# Patient Record
Sex: MALE | Race: WHITE | Employment: FULL TIME | URBAN - METROPOLITAN AREA
[De-identification: names, ages, dates, MRNs, and addresses within clinical notes are randomized per-mention and may not be internally consistent; named-entity substitution may affect disease eponyms.]

---

## 2018-08-27 PROBLEM — M25.579 JOINT PAIN OF ANKLE AND FOOT: Status: ACTIVE | Noted: 2018-08-27

## 2018-08-27 PROBLEM — M79.89 LEG SWELLING: Status: ACTIVE | Noted: 2018-08-27

## 2018-08-27 PROBLEM — D45 POLYCYTHEMIA VERA (HCC): Status: ACTIVE | Noted: 2018-08-27

## 2018-08-27 PROBLEM — I87.2 CHRONIC VENOUS INSUFFICIENCY: Status: ACTIVE | Noted: 2018-08-27

## 2018-08-27 PROBLEM — G43.109 MIGRAINE HEADACHE WITH AURA: Status: ACTIVE | Noted: 2018-08-27

## 2018-08-27 PROBLEM — M32.9 LUPUS (HCC): Status: ACTIVE | Noted: 2018-08-27

## 2018-08-27 PROBLEM — M54.50 LOWER BACK PAIN: Status: ACTIVE | Noted: 2018-08-27

## 2018-08-27 PROBLEM — M54.2 NECK PAIN: Status: ACTIVE | Noted: 2018-08-27

## 2018-08-27 PROBLEM — R91.1 LUNG NODULE: Status: ACTIVE | Noted: 2018-08-27

## 2018-08-27 PROBLEM — E78.1 HYPERTRIGLYCERIDEMIA: Status: ACTIVE | Noted: 2018-08-27

## 2018-08-27 RX ORDER — CYCLOBENZAPRINE HCL 10 MG
TABLET ORAL
COMMUNITY
End: 2018-08-30 | Stop reason: ALTCHOICE

## 2018-08-27 RX ORDER — GUAIFENESIN 100 MG/5ML
81 LIQUID (ML) ORAL DAILY
COMMUNITY

## 2018-08-27 RX ORDER — CHOLECALCIFEROL (VITAMIN D3) 125 MCG
CAPSULE ORAL
COMMUNITY
End: 2018-08-30 | Stop reason: ALTCHOICE

## 2018-08-27 RX ORDER — TRIAMTERENE/HYDROCHLOROTHIAZID 37.5-25 MG
TABLET ORAL DAILY
COMMUNITY
End: 2018-08-30 | Stop reason: ALTCHOICE

## 2018-08-27 RX ORDER — METAXALONE 800 MG/1
TABLET ORAL
COMMUNITY
End: 2018-08-30 | Stop reason: ALTCHOICE

## 2018-08-27 RX ORDER — CETIRIZINE HCL 10 MG
TABLET ORAL
COMMUNITY
End: 2018-08-30 | Stop reason: ALTCHOICE

## 2018-08-30 ENCOUNTER — OFFICE VISIT (OUTPATIENT)
Dept: FAMILY MEDICINE CLINIC | Age: 63
End: 2018-08-30

## 2018-08-30 VITALS
HEART RATE: 77 BPM | HEIGHT: 71 IN | WEIGHT: 188.1 LBS | RESPIRATION RATE: 20 BRPM | BODY MASS INDEX: 26.33 KG/M2 | SYSTOLIC BLOOD PRESSURE: 120 MMHG | TEMPERATURE: 97.6 F | OXYGEN SATURATION: 97 % | DIASTOLIC BLOOD PRESSURE: 80 MMHG

## 2018-08-30 DIAGNOSIS — E53.8 VITAMIN B 12 DEFICIENCY: Primary | ICD-10-CM

## 2018-08-30 DIAGNOSIS — E78.1 HYPERTRIGLYCERIDEMIA: ICD-10-CM

## 2018-08-30 DIAGNOSIS — I10 ESSENTIAL HYPERTENSION: ICD-10-CM

## 2018-08-30 DIAGNOSIS — E10.65 HYPERGLYCEMIA DUE TO TYPE 1 DIABETES MELLITUS (HCC): ICD-10-CM

## 2018-08-30 RX ORDER — BISMUTH SUBSALICYLATE 262 MG
1 TABLET,CHEWABLE ORAL DAILY
COMMUNITY

## 2018-08-30 RX ORDER — METOPROLOL TARTRATE 25 MG/1
TABLET, FILM COATED ORAL 2 TIMES DAILY
COMMUNITY

## 2018-08-30 RX ORDER — CYANOCOBALAMIN 1000 UG/ML
1000 INJECTION, SOLUTION INTRAMUSCULAR; SUBCUTANEOUS ONCE
Qty: 1 VIAL | Refills: 0
Start: 2018-08-30 | End: 2018-08-30

## 2018-08-30 RX ORDER — ROSUVASTATIN CALCIUM 40 MG/1
40 TABLET, COATED ORAL
COMMUNITY

## 2018-08-30 NOTE — PATIENT INSTRUCTIONS
Type 1 Diabetes: Care Instructions  Your Care Instructions    Type 1 diabetes is a lifelong disease that develops when the pancreas stops making insulin. The body needs insulin to let sugar (glucose) move from the blood into the body's cells, where it can be used for energy or stored for later use. Without insulin, the sugar cannot get into the cells to do its work. It stays in the blood instead. This can cause high blood sugar levels. A person has diabetes when the blood sugar is too high. Over time, diabetes can lead to diseases of the heart, blood vessels, nerves, kidneys, and eyes. To treat type 1 diabetes, you need insulin. You can give yourself insulin through an insulin pump, an insulin pen, or a syringe (needle). Insulin, exercise, and a healthy diet can help prevent or delay problems from diabetes. With education and support, you will treat diabetes as a part of your life-not your whole life. Seek support when you need it from your family, friends, and your doctor or other diabetes experts. Follow-up care is a key part of your treatment and safety. Be sure to make and go to all appointments, and call your doctor if you are having problems. It's also a good idea to know your test results and keep a list of the medicines you take. How can you care for yourself at home? · Take your insulin on time and in the right dose. This helps keep your blood sugar steady. Do not stop or change your insulin without talking to your doctor first.  · Check and record your blood sugar as often as directed. These records can help your doctor see how you are doing and adjust your treatment if needed. It is important to keep track of any symptoms you have, such as low blood sugar, and any changes in your activities, diet, or insulin use. · Eat a good diet that spreads carbohydrate throughout the day. Carbohydrate-the body's main source of fuel-affects blood sugar more than any other nutrient.  Carbohydrate is in fruits, vegetables, milk, and yogurt. It also is in breads, cereals, vegetables such as potatoes and corn, and sugary foods such as candy and cakes. · Aim for 30 minutes of exercise on most, preferably all, days of the week. Walking is a good choice. You also may want to do other activities, such as running, swimming, cycling, or playing tennis or team sports. If yourdoctor says it's okay, do muscle-strengthening exercises at least 2 times a week. · Control your cholesterol, and try to keep your blood pressure at 140/90 or below. Exercise and healthy eating can help with these goals. If you have medicine for cholesterol or high blood pressure, take it as directed. Do not stop or change a medicine without talking to your doctor first.  · If you have discussed it with your doctor, take a low-dose aspirin every day to help prevent heart attack and stroke. Do not start taking aspirin unless your doctor knows about it. · Do not smoke. If you need help quitting, talk to your doctor about stop-smoking programs and medicines. These can increase your chances of quitting for good. · Check your feet daily for blisters, cracks, and sores. Have your doctor look at your feet whenever you have a checkup. · Get a checkup every 3 to 6 months. Your doctor will tell you how often to come in. You will need regular tests such as:  ¨ A hemoglobin A1c test. You may need this test more often than once a year. It is a good measure of how well your treatment is working. ¨ A cholesterol test.  ¨ A urine test for protein. This checks for kidney problems. ¨ A complete foot exam.  ¨ An eye exam, even if you do not think your vision has changed. When should you call for help? Call 911 anytime you think you may need emergency care. For example, call if:    · You passed out (lost consciousness), or you suddenly become very sleepy or confused.  (You may have very low blood sugar.)     · You have symptoms of high blood sugar, such as:  ¨ Blurred vision. ¨ Trouble staying awake or being woken up. ¨ Fast, deep breathing. ¨ Breath that smells fruity. ¨ Belly pain, not feeling hungry, and vomiting. ¨ Feeling confused.    Call your doctor now or seek immediate medical care if:    · Your blood sugar stays higher than the level your doctor has set for you.     · You have symptoms of low blood sugar, such as:  ¨ Sweating. ¨ Feeling nervous, shaky, and weak. ¨ Extreme hunger and slight nausea. ¨ Dizziness and headache. ¨ Blurred vision. ¨ Confusion.    Watch closely for changes in your health, and be sure to contact your doctor if:    · You often have problems controlling your blood sugar.     · You have symptoms of long-term diabetes problems, such as:  ¨ New vision changes. ¨ New pain, numbness, or tingling in your hands or feet. ¨ Skin problems. Where can you learn more? Go to http://ernie-rebecca.info/. Enter P859 in the search box to learn more about \"Type 1 Diabetes: Care Instructions. \"  Current as of: December 7, 2017  Content Version: 11.7  © 3623-1727 [employer]. Care instructions adapted under license by [employer] (which disclaims liability or warranty for this information). If you have questions about a medical condition or this instruction, always ask your healthcare professional. [address] any warranty or liability for your use of this information.

## 2018-08-30 NOTE — PROGRESS NOTES
Assessment/Plan:     Diagnoses and all orders for this visit:    1. Vitamin B 12 deficiency  -     cyanocobalamin (VITAMIN B12) 1,000 mcg/mL injection; 1 mL by IntraMUSCular route once for 1 dose. - Injection today. 2. Hypertriglyceridemia   -Presumed stable. Continue current therapy. Need updated labs. 3. Hyperglycemia due to type 1 diabetes mellitus ([facility] [address]   - Managed by Endo    4. Essential hypertension   - Stable, continue current therapy      Follow-up Disposition: Not on File    Discussed expected course/resolution/complications of diagnosis in detail with patient.    Medication risks/benefits/costs/interactions/alternatives discussed with patient.    Pt was given after visit summary which includes diagnoses, current medications & vitals. Pt expressed understanding with the diagnosis and plan        Subjective:      Maria Guadalupe Cleveland is a 58 y.o. male who presents for had concerns including Annual Wellness Visit and Injection B12. At clinic today nurse noticed sweating and incoherence and took blood sugar and it was 36. Three glucose tabs were taken and some sugar packets and blood sugar came up to 78 then 102. Incoherence and sweating resolved. Instructed Mr. Cleveland to follow up with Dr. Paul Acosta. Here today for complete physical.  Sees a dentist every 6 months. Eye doctor every year. Unsure of when colonoscopy is due. Sees a cardiologist every year. Sees endo every 4 months. Reports having some hypoglycemia episodes. Had a hypoglycemia episode last week. Current Outpatient Prescriptions   Medication Sig Dispense Refill    metoprolol tartrate (LOPRESSOR) 25 mg tablet Take  by mouth two (2) times a day.  multivitamin (ONE A DAY) tablet Take 1 Tab by mouth daily.  rosuvastatin (CRESTOR) 40 mg tablet Take 40 mg by mouth nightly.  cyanocobalamin (VITAMIN B12) 1,000 mcg/mL injection 1 mL by IntraMUSCular route once for 1 dose.  1 Vial 0    aspirin 81 mg chewable tablet Take 81 mg by mouth daily. Allergies   Allergen Reactions    Sulfasalazine Unknown (comments)     Noted: itching    Zetia [Ezetimibe] Unknown (comments)     None noted       ROS:   Review of Systems   Constitutional: Negative for fever and malaise/fatigue. HENT: Negative for congestion, ear pain, sinus pain and sore throat. Eyes: Negative for blurred vision. Respiratory: Negative for cough and shortness of breath. Cardiovascular: Negative for chest pain, claudication and leg swelling. Gastrointestinal: Negative for abdominal pain, nausea and vomiting. Genitourinary: Negative for dysuria. Musculoskeletal: Negative for back pain, falls, joint pain and neck pain. Neurological: Negative for dizziness, tingling, weakness and headaches. Psychiatric/Behavioral: Negative for depression. The patient is not nervous/anxious. Objective:     Visit Vitals    /80 (BP 1 Location: Right arm, BP Patient Position: Sitting)    Pulse 77    Temp 97.6 °F (36.4 °C) (Oral)    Resp 20    Ht 5' 11\" (1.803 m)    Wt 188 lb 1.6 oz (85.3 kg)    SpO2 97%    BMI 26.23 kg/m2       Vitals and Nurse Documentation reviewed. Physical Exam   Constitutional: He is well-developed, well-nourished, and in no distress. HENT:   Head: Normocephalic and atraumatic. Right Ear: Tympanic membrane normal.   Left Ear: Tympanic membrane normal.   Nose: Nose normal.   Mouth/Throat: Oropharynx is clear and moist. Normal dentition. Eyes: EOM are normal. Pupils are equal, round, and reactive to light. Right eye exhibits no discharge. Left eye exhibits no discharge. Right conjunctiva is not injected. Left conjunctiva is not injected. Neck: Neck supple. Carotid bruit is not present. No thyroid mass and no thyromegaly present. Cardiovascular: Normal rate, regular rhythm, S1 normal and S2 normal.  Exam reveals no gallop and no friction rub. No murmur heard.   Pulses:       Dorsalis pedis pulses are 2+ on the right side Posterior tibial pulses are 2+ on the right side   Pulmonary/Chest: Breath sounds normal.   Abdominal: Normal appearance and bowel sounds are normal. He exhibits no distension and no mass. There is no hepatosplenomegaly. There is no tenderness. Musculoskeletal: Normal range of motion. Lymphadenopathy:     He has no cervical adenopathy. Neurological: He is alert. He has normal sensation and normal strength. Gait normal. Gait normal.   Skin: Skin is warm, dry and intact. No rash noted. Psychiatric: Mood and affect normal.       No results found for this or any previous visit.

## 2018-08-30 NOTE — PROGRESS NOTES
Pt here for   Chief Complaint   Patient presents with    Annual Wellness Visit    Injection B12     1. Have you been to the ER, urgent care clinic since your last visit? Hospitalized since your last visit? No    2. Have you seen or consulted any other health care providers outside of the [address] [place] [address] since your last visit? Include any pap smears or colon screening.  No       Pt denies pain at this time        PHQ over the last two weeks 8/30/2018   Little interest or pleasure in doing things Not at all   Feeling down, depressed, irritable, or hopeless Not at all   Total Score PHQ 2 0

## 2018-10-03 ENCOUNTER — CLINICAL SUPPORT (OUTPATIENT)
Dept: FAMILY MEDICINE CLINIC | Age: 63
End: 2018-10-03

## 2018-10-03 VITALS
BODY MASS INDEX: 26.18 KG/M2 | DIASTOLIC BLOOD PRESSURE: 69 MMHG | TEMPERATURE: 98.3 F | HEART RATE: 83 BPM | WEIGHT: 187 LBS | SYSTOLIC BLOOD PRESSURE: 117 MMHG | OXYGEN SATURATION: 98 % | RESPIRATION RATE: 18 BRPM | HEIGHT: 71 IN

## 2018-10-03 DIAGNOSIS — E53.8 B12 DEFICIENCY: Primary | ICD-10-CM

## 2018-10-03 RX ORDER — CYANOCOBALAMIN 1000 UG/ML
1000 INJECTION, SOLUTION INTRAMUSCULAR; SUBCUTANEOUS ONCE
Qty: 1 ML | Refills: 0
Start: 2018-10-03 | End: 2018-10-03

## 2018-11-07 ENCOUNTER — TELEPHONE (OUTPATIENT)
Dept: FAMILY MEDICINE CLINIC | Age: 63
End: 2018-11-07

## 2018-11-07 DIAGNOSIS — E53.8 B12 DEFICIENCY: Primary | ICD-10-CM

## 2018-11-07 NOTE — TELEPHONE ENCOUNTER
Patient came into clinic today to have b12 shot. He believed that we still carried B12 in the office and as a result did not bring medication with him. Would you be able to order B12 prescription to his pharmacy so he can bring it in for future visit.   Gina Dennis

## 2018-11-08 RX ORDER — CYANOCOBALAMIN 1000 UG/ML
1000 INJECTION, SOLUTION INTRAMUSCULAR; SUBCUTANEOUS
Qty: 10 ML | Refills: 1 | Status: SHIPPED | OUTPATIENT
Start: 2018-11-08 | End: 2020-01-24

## 2018-11-09 NOTE — TELEPHONE ENCOUNTER
11/08/18  7:36 PM    1. B12 deficiency    - cyanocobalamin (VITAMIN B12) 1,000 mcg/mL injection; 1 mL by IntraMUSCular route every thirty (30) days.   Dispense: 10 mL; Refill: 1      Sudheer Mena MD

## 2019-05-24 ENCOUNTER — OFFICE VISIT (OUTPATIENT)
Dept: FAMILY MEDICINE CLINIC | Age: 64
End: 2019-05-24

## 2019-05-24 VITALS
TEMPERATURE: 97.9 F | DIASTOLIC BLOOD PRESSURE: 72 MMHG | BODY MASS INDEX: 24.92 KG/M2 | HEART RATE: 65 BPM | RESPIRATION RATE: 16 BRPM | WEIGHT: 178 LBS | OXYGEN SATURATION: 98 % | HEIGHT: 71 IN | SYSTOLIC BLOOD PRESSURE: 119 MMHG

## 2019-05-24 DIAGNOSIS — B02.9 HERPES ZOSTER WITHOUT COMPLICATION: Primary | ICD-10-CM

## 2019-05-24 RX ORDER — BLOOD SUGAR DIAGNOSTIC
STRIP MISCELLANEOUS
Refills: 3 | COMMUNITY
Start: 2019-04-10

## 2019-05-24 RX ORDER — VALACYCLOVIR HYDROCHLORIDE 1 G/1
1000 TABLET, FILM COATED ORAL 3 TIMES DAILY
Qty: 21 TAB | Refills: 0 | Status: SHIPPED | OUTPATIENT
Start: 2019-05-24 | End: 2019-05-31

## 2019-05-24 RX ORDER — BLOOD-GLUCOSE METER
EACH MISCELLANEOUS
Refills: 0 | COMMUNITY
Start: 2019-04-10

## 2019-05-24 RX ORDER — FLASH GLUCOSE SENSOR
KIT MISCELLANEOUS
Refills: 11 | COMMUNITY
Start: 2019-04-09

## 2019-05-24 RX ORDER — FLASH GLUCOSE SCANNING READER
EACH MISCELLANEOUS
Refills: 0 | COMMUNITY
Start: 2019-04-09

## 2019-05-24 RX ORDER — INSULIN DEGLUDEC 200 U/ML
INJECTION, SOLUTION SUBCUTANEOUS
COMMUNITY
Start: 2019-01-03

## 2019-05-24 RX ORDER — LANCETS
EACH MISCELLANEOUS
Refills: 1 | COMMUNITY
Start: 2019-04-09

## 2019-05-24 NOTE — PROGRESS NOTES
Identified pt with two pt identifiers(name and ). Reviewed record in preparation for visit and have obtained necessary documentation. Chief Complaint   Patient presents with    Back Pain     low back pain on both side; pain radiates down right leg; has become increasingly worse    Rash     painfu and itchy rash on back, flank (bilateral); rash first appeared approx 3 days ago        Health Maintenance Due   Topic    Hepatitis C Screening     Pneumococcal 0-64 years (1 of 1 - PPSV23)    FOOT EXAM Q1     EYE EXAM RETINAL OR DILATED     DTaP/Tdap/Td series (1 - Tdap)    Shingrix Vaccine Age 50> (1 of 2)    FOBT Q 1 YEAR AGE 50-75        Coordination of Care Questionnaire:  :   1) Have you been to an emergency room, urgent care, or hospitalized since your last visit? If yes, where when, and reason for visit? no       2. Have seen or consulted any other health care provider since your last visit? If yes, where when, and reason for visit? NO      Patient is accompanied by self I have received verbal consent from Alexandria Cleveland to discuss any/all medical information while they are present in the room.

## 2019-05-24 NOTE — PROGRESS NOTES
Assessment/Plan:     Diagnoses and all orders for this visit:    1. Herpes zoster without complication  -     valACYclovir (VALTREX) 1 gram tablet; Take 1 Tab by mouth three (3) times daily for 7 days. - Worsening, start valacyclovir today, aleve for pain. RTC if pain and burning persists, otherwise RTC when completely healed for discussion on Shingrix vaccination         Follow-up and Dispositions    · Return in about 3 months (around 8/24/2019) for Follow Up. Discussed expected course/resolution/complications of diagnosis in detail with patient.    Medication risks/benefits/costs/interactions/alternatives discussed with patient.    Pt was given after visit summary which includes diagnoses, current medications & vitals. Pt expressed understanding with the diagnosis and plan        Subjective:      Earline Cleveland is a 61 y.o. male who presents for had concerns including Back Pain (low back pain on both side; pain radiates down right leg; has become increasingly worse) and Rash (painfu and itchy rash on back, flank (bilateral); rash first appeared approx 3 days ago). Here today for rash that started 3-4 days ago with pain around the area that started about 2 weeks ago. Rash is rad and has blisters and madsen. Rash is located on left lower back. He has not put anything on the rash     Current Outpatient Medications   Medication Sig Dispense Refill    insulin degludec (TRESIBA FLEXTOUCH U-200) 200 unit/mL (3 mL) inpn INJECT UP TO 40 UNITS SUBCUTANEOUSLY ONE TIME DAILY ONCE A DAY SUBCUTANEOUS 90 DAYS      valACYclovir (VALTREX) 1 gram tablet Take 1 Tab by mouth three (3) times daily for 7 days. 21 Tab 0    cyanocobalamin (VITAMIN B12) 1,000 mcg/mL injection 1 mL by IntraMUSCular route every thirty (30) days. 10 mL 1    metoprolol tartrate (LOPRESSOR) 25 mg tablet Take  by mouth two (2) times a day.  multivitamin (ONE A DAY) tablet Take 1 Tab by mouth daily.       rosuvastatin (CRESTOR) 40 mg tablet Take 40 mg by mouth nightly.  aspirin 81 mg chewable tablet Take 81 mg by mouth daily.  FREESTYLE FOSTER 14 DAY READER misc AS DIRECTED 1 DOSE  0    FREESTYLE FOSTER 10 DAY SENSOR kit AS DIRECTED EVERY 14 DAYS SUBCUTANEOUS 28 DAYS  11    ACCU-CHEK GUIDE GLUCOSE METER misc USE AS DIRECTED  0    ACCU-CHEK GUIDE strip TESTS BLOOD SUGAR THREE TIMES A DAY IN VITRO 90 DAYS  3    ACCU-CHEK SOFTCLIX LANCETS misc USE TO TEST BLOOD SUGAR THREE TIMES A DAY XXX 90 DAYS  1       Allergies   Allergen Reactions    Sulfa (Sulfonamide Antibiotics) Nausea and Vomiting    Sulfasalazine Unknown (comments)     Noted: itching    Zetia [Ezetimibe] Not Reported This Time     None noted       ROS:   Review of Systems   Constitutional: Positive for malaise/fatigue. Negative for fever. Respiratory: Negative for cough and shortness of breath. Cardiovascular: Negative for chest pain and palpitations. Musculoskeletal: Positive for back pain. Skin: Positive for itching and rash. Neurological: Negative for dizziness and headaches. Objective:     Visit Vitals  /72   Pulse 65   Temp 97.9 °F (36.6 °C) (Oral)   Resp 16   Ht 5' 11\" (1.803 m)   Wt 178 lb (80.7 kg)   SpO2 98%   BMI 24.83 kg/m²       Vitals and Nurse Documentation reviewed. Physical Exam   Constitutional: He is well-developed, well-nourished, and in no distress. No distress. HENT:   Head: Normocephalic and atraumatic. Cardiovascular: Normal rate, regular rhythm and normal heart sounds. Exam reveals no gallop and no friction rub. No murmur heard. Pulmonary/Chest: Effort normal and breath sounds normal. No respiratory distress. He has no wheezes. He has no rales. Neurological: He is alert. Skin: He is not diaphoretic. Large scattered eruption of vesicles and erythematous rash noted with two satellite sites proximal to large area.     Psychiatric: Affect normal.       Results for orders placed or performed in visit on 05/08/19   AMB EXT LDL-C   Result Value Ref Range    LDL-C, External 74    AMB EXT CREATININE   Result Value Ref Range    Creatinine, External 1.13    AMB EXT HGBA1C   Result Value Ref Range    Hemoglobin A1c, External 8.2    AMB EXT URINE MICROALBUMIN   Result Value Ref Range    Urine Microalbumin, External <3.0

## 2019-05-24 NOTE — PATIENT INSTRUCTIONS

## 2020-03-18 ENCOUNTER — TELEPHONE (OUTPATIENT)
Dept: FAMILY MEDICINE CLINIC | Age: 65
End: 2020-03-18

## 2020-03-18 NOTE — TELEPHONE ENCOUNTER
Pt is calling to come in to see a doctor to have a note to return to work from a prior cold that we have not seen him for. He does still have a light cough.     Best Contact  [phone]

## 2020-03-19 ENCOUNTER — TELEPHONE (OUTPATIENT)
Dept: FAMILY MEDICINE CLINIC | Age: 65
End: 2020-03-19

## 2020-03-19 ENCOUNTER — OFFICE VISIT (OUTPATIENT)
Dept: FAMILY MEDICINE CLINIC | Age: 65
End: 2020-03-19

## 2020-03-19 VITALS
RESPIRATION RATE: 16 BRPM | HEART RATE: 72 BPM | WEIGHT: 175.2 LBS | HEIGHT: 71 IN | BODY MASS INDEX: 24.53 KG/M2 | SYSTOLIC BLOOD PRESSURE: 120 MMHG | TEMPERATURE: 97.4 F | OXYGEN SATURATION: 95 % | DIASTOLIC BLOOD PRESSURE: 72 MMHG

## 2020-03-19 DIAGNOSIS — J06.9 VIRAL UPPER RESPIRATORY TRACT INFECTION: Primary | ICD-10-CM

## 2020-03-19 NOTE — LETTER
3/19/2020 11:55 AM 
 
 
 
 
RE:   Mr. Nina Goldberg  
 [address] [place] [id number] To whom it may concern: Mr. Arsenio Rutledge was seen in clinic today only. He exhibits no fever or respiratory symptoms. He denies cough and shortness of breath. He denies any known exposure to COVID-19. To the best of our ability, he may return to work. Sincerely, Chacorta Tobin NP

## 2020-03-19 NOTE — PROGRESS NOTES
Assessment/Plan:     Diagnoses and all orders for this visit:    1. Viral upper respiratory tract infection    - Improved. Letter given for work to return to the best of our ability patient may return to work. No fever. This office does not have the ability to test for COVID-19. Discussed expected course/resolution/complications of diagnosis in detail with patient. Medication risks/benefits/costs/interactions/alternatives discussed with patient. Pt was given after visit summary which includes diagnoses, current medications & vitals. Pt expressed understanding with the diagnosis and plan        Subjective:      Maria Guadalupe Cleveland is a 59 y.o. male who presents for had concerns including Letter for School/Work. Here today for note to return to work. States he had a \"cold\" for the past 2 days. Symptoms were cough and congestion on Tuesday then Wednesday improvement. Denies fevers or SOB. Denies any known exposure for COVID-19. Was not seen by anyone. Current Outpatient Medications   Medication Sig Dispense Refill    insulin degludec (TRESIBA FLEXTOUCH U-200) 200 unit/mL (3 mL) inpn INJECT UP TO 40 UNITS SUBCUTANEOUSLY ONE TIME DAILY ONCE A DAY SUBCUTANEOUS 90 DAYS      FREESTYLE FOSTER 14 DAY READER misc AS DIRECTED 1 DOSE  0    FREESTYLE FOSTER 10 DAY SENSOR kit AS DIRECTED EVERY 14 DAYS SUBCUTANEOUS 28 DAYS  11    ACCU-CHEK GUIDE GLUCOSE METER misc USE AS DIRECTED  0    ACCU-CHEK GUIDE strip TESTS BLOOD SUGAR THREE TIMES A DAY IN VITRO 90 DAYS  3    ACCU-CHEK SOFTCLIX LANCETS misc USE TO TEST BLOOD SUGAR THREE TIMES A DAY XXX 90 DAYS  1    metoprolol tartrate (LOPRESSOR) 25 mg tablet Take  by mouth two (2) times a day.  multivitamin (ONE A DAY) tablet Take 1 Tab by mouth daily.  rosuvastatin (CRESTOR) 40 mg tablet Take 40 mg by mouth nightly.  aspirin 81 mg chewable tablet Take 81 mg by mouth daily.       cyanocobalamin (VITAMIN B12) 1,000 mcg/mL injection INJECT 1 ML BY INTRAMUSCULAR ROUTE EVERY THIRTY (30) DAYS. 3 mL 1       Allergies   Allergen Reactions    Sulfa (Sulfonamide Antibiotics) Nausea and Vomiting    Sulfasalazine Unknown (comments)     Noted: itching    Zetia [Ezetimibe] Not Reported This Time     None noted     Past Medical History:   Diagnosis Date    [facility] failure ([facility] [address]     [facility]      Past Surgical History:   Procedure Laterality Date    CARDIAC SURG PROCEDURE UNLIST       History reviewed. No pertinent family history.   Social History     Socioeconomic History    Marital status:      Spouse name: Not on file    Number of children: Not on file    Years of education: Not on file    Highest education level: Not on file   Occupational History    Not on file   Social Needs    Financial resource strain: Not on file    Food insecurity     Worry: Not on file     Inability: Not on file    Transportation needs     Medical: Not on file     Non-medical: Not on file   Tobacco Use    Smoking status: Never Smoker    Smokeless tobacco: Never Used   Substance and Sexual Activity    Alcohol use: Yes     Comment: Rarely    Drug use: No    Sexual activity: Yes     Partners: Female     Birth control/protection: None   Lifestyle    Physical activity     Days per week: Not on file     Minutes per session: Not on file    Stress: Not on file   Relationships    Social connections     Talks on phone: Not on file     Gets together: Not on file     Attends Nondenominational service: Not on file     Active member of club or organization: Not on file     Attends meetings of clubs or organizations: Not on file     Relationship status: Not on file    Intimate partner violence     Fear of current or ex partner: Not on file     Emotionally abused: Not on file     Physically abused: Not on file     Forced sexual activity: Not on file   Other Topics Concern    Not on file   Social History Narrative    Not on file       HPI      ROS:   Review of Systems Constitutional: Negative for chills, fever and malaise/fatigue. HENT: Negative for congestion, ear pain and sinus pain. Eyes: Negative for blurred vision. Respiratory: Negative for cough, sputum production and shortness of breath. Cardiovascular: Negative for chest pain, palpitations and leg swelling. Neurological: Negative for dizziness and headaches. Objective:     Visit Vitals  /72   Pulse 72   Temp 97.4 °F (36.3 °C) (Oral)   Resp 16   Ht 5' 11\" (1.803 m)   Wt 175 lb 3.2 oz (79.5 kg)   SpO2 95%   BMI 24.44 kg/m²         Vitals and Nurse Documentation reviewed. Physical Exam  Constitutional:       General: He is not in acute distress. HENT:      Right Ear: No middle ear effusion. Tympanic membrane is not erythematous or bulging. Left Ear:  No middle ear effusion. Tympanic membrane is not erythematous or bulging. Nose: No mucosal edema. Right Sinus: No maxillary sinus tenderness or frontal sinus tenderness. Left Sinus: No maxillary sinus tenderness or frontal sinus tenderness. Mouth/Throat:      Pharynx: No oropharyngeal exudate or posterior oropharyngeal erythema. Cardiovascular:      Heart sounds: S1 normal and S2 normal. No murmur. No friction rub. No gallop. Pulmonary:      Effort: No respiratory distress. Breath sounds: Normal breath sounds. No wheezing. Lymphadenopathy:      Cervical: No cervical adenopathy. Neurological:      Mental Status: He is oriented to person, place, and time.          Results for orders placed or performed in visit on 05/08/19   AMB EXT LDL-C   Result Value Ref Range    LDL-C, External 74    AMB EXT CREATININE   Result Value Ref Range    Creatinine, External 1.13    AMB EXT HGBA1C   Result Value Ref Range    Hemoglobin A1c, External 8.2    AMB EXT URINE MICROALBUMIN   Result Value Ref Range    Urine Microalbumin, External <3.0

## 2020-03-19 NOTE — TELEPHONE ENCOUNTER
Pt states that he was not seen by any Urgent Care and would like to schedule with either Raghavendra or Huber so that he can return to work.     States that he no longer has a cough, fever, or SOB     Can he be seen in office

## 2020-03-19 NOTE — PROGRESS NOTES
Identified pt with two pt identifiers(name and ). Reviewed record in preparation for visit and have obtained necessary documentation. Chief Complaint   Patient presents with    Letter for School/Work        Health Maintenance Due   Topic    Hepatitis C Screening     Foot Exam Q1     Eye Exam Retinal or Dilated     DTaP/Tdap/Td series (1 - Tdap)    Shingrix Vaccine Age 50> (1 of 2)    FOBT Q1Y Age 54-65     Influenza Age 5 to Adult     MICROALBUMIN Q1        Coordination of Care Questionnaire:  :   1) Have you been to an emergency room, urgent care, or hospitalized since your last visit? If yes, where when, and reason for visit? no      2. Have seen or consulted any other health care provider since your last visit? Yes  If yes, where when, and reason for visit? Dr. Alonzo Cummins        Patient is accompanied by self I have received verbal consent from Pamela Cleveland to discuss any/all medical information while they are present in the room.

## 2020-12-11 DIAGNOSIS — E53.8 B12 DEFICIENCY: ICD-10-CM

## 2020-12-14 RX ORDER — CYANOCOBALAMIN 1000 UG/ML
INJECTION, SOLUTION INTRAMUSCULAR; SUBCUTANEOUS
Qty: 3 ML | Refills: 1 | Status: SHIPPED | OUTPATIENT
Start: 2020-12-14 | End: 2021-05-17

## 2021-05-05 ENCOUNTER — TRANSCRIBE ORDER (OUTPATIENT)
Dept: SCHEDULING | Age: 66
End: 2021-05-05

## 2021-05-05 DIAGNOSIS — M75.111 NONTRAUMATIC INCOMPLETE TEAR OF RIGHT ROTATOR CUFF: Primary | ICD-10-CM

## 2021-05-17 DIAGNOSIS — E53.8 B12 DEFICIENCY: ICD-10-CM

## 2021-05-17 RX ORDER — CYANOCOBALAMIN 1000 UG/ML
INJECTION, SOLUTION INTRAMUSCULAR; SUBCUTANEOUS
Qty: 3 ML | Refills: 1 | Status: SHIPPED | OUTPATIENT
Start: 2021-05-17 | End: 2022-01-31 | Stop reason: SDUPTHER

## 2021-05-18 ENCOUNTER — HOSPITAL ENCOUNTER (OUTPATIENT)
Dept: MRI IMAGING | Age: 66
Discharge: HOME | End: 2021-05-18
Attending: ORTHOPAEDIC SURGERY
Payer: COMMERCIAL

## 2021-05-18 DIAGNOSIS — M75.111 NONTRAUMATIC INCOMPLETE TEAR OF RIGHT ROTATOR CUFF: ICD-10-CM

## 2021-05-18 PROCEDURE — 73221 MRI JOINT UPR EXTREM W/O DYE: CPT

## 2021-10-29 DIAGNOSIS — E53.8 B12 DEFICIENCY: ICD-10-CM

## 2021-10-29 RX ORDER — CYANOCOBALAMIN 1000 UG/ML
INJECTION, SOLUTION INTRAMUSCULAR; SUBCUTANEOUS
Qty: 3 ML | Refills: 1
Start: 2021-10-29

## 2021-10-29 NOTE — TELEPHONE ENCOUNTER
PCP: Rut Moses MD    Last appt: Visit date not found  No future appointments.     Requested Prescriptions     Pending Prescriptions Disp Refills    cyanocobalamin (VITAMIN B12) 1,000 mcg/mL injection 3 mL 1       Prior labs and Blood pressures:  BP Readings from Last 3 Encounters:   03/19/20 120/72   05/24/19 119/72   10/03/18 117/69     No results found for: NA, K, CL, CO2, AGAP, GLU, BUN, CREA, BUCR, GFRAA, GFRNA, CA, GFRAA  Lab Results   Component Value Date/Time    Hemoglobin A1c, External 8.2 04/09/2019 12:00 AM     No results found for: CHOL, CHOLPOCT, CHOLX, CHLST, CHOLV, HDL, HDLPOC, HDLP, LDL, LDLCPOC, LDLC, DLDLP, VLDLC, VLDL, TGLX, TRIGL, TRIGP, TGLPOCT, CHHD, CHHDX  No results found for: VITD3, XQVID2, XQVID3, XQVID, VD3RIA    No results found for: TSH, TSH2, TSH3, TSHP, TSHEXT

## 2021-12-14 LAB — HBA1C MFR BLD HPLC: 11.3 %

## 2022-01-31 ENCOUNTER — VIRTUAL VISIT (OUTPATIENT)
Dept: FAMILY MEDICINE CLINIC | Age: 67
End: 2022-01-31
Payer: MEDICARE

## 2022-01-31 DIAGNOSIS — E10.59 TYPE 1 DIABETES MELLITUS WITH OTHER CIRCULATORY COMPLICATION (HCC): ICD-10-CM

## 2022-01-31 DIAGNOSIS — Z12.5 SCREENING PSA (PROSTATE SPECIFIC ANTIGEN): ICD-10-CM

## 2022-01-31 DIAGNOSIS — E53.8 B12 DEFICIENCY: Primary | ICD-10-CM

## 2022-01-31 DIAGNOSIS — E78.1 HYPERTRIGLYCERIDEMIA: ICD-10-CM

## 2022-01-31 PROCEDURE — 99214 OFFICE O/P EST MOD 30 MIN: CPT | Performed by: NURSE PRACTITIONER

## 2022-01-31 PROCEDURE — G8427 DOCREV CUR MEDS BY ELIG CLIN: HCPCS | Performed by: NURSE PRACTITIONER

## 2022-01-31 PROCEDURE — 3046F HEMOGLOBIN A1C LEVEL >9.0%: CPT | Performed by: NURSE PRACTITIONER

## 2022-01-31 PROCEDURE — 1101F PT FALLS ASSESS-DOCD LE1/YR: CPT | Performed by: NURSE PRACTITIONER

## 2022-01-31 PROCEDURE — 3017F COLORECTAL CA SCREEN DOC REV: CPT | Performed by: NURSE PRACTITIONER

## 2022-01-31 PROCEDURE — 2022F DILAT RTA XM EVC RTNOPTHY: CPT | Performed by: NURSE PRACTITIONER

## 2022-01-31 PROCEDURE — G8432 DEP SCR NOT DOC, RNG: HCPCS | Performed by: NURSE PRACTITIONER

## 2022-01-31 RX ORDER — CYANOCOBALAMIN 1000 UG/ML
INJECTION, SOLUTION INTRAMUSCULAR; SUBCUTANEOUS
Qty: 3 ML | Refills: 1
Start: 2022-01-31 | End: 2022-01-31 | Stop reason: SDUPTHER

## 2022-01-31 RX ORDER — CYANOCOBALAMIN 1000 UG/ML
INJECTION, SOLUTION INTRAMUSCULAR; SUBCUTANEOUS
Qty: 3 ML | Refills: 1 | Status: SHIPPED | OUTPATIENT
Start: 2022-01-31

## 2022-01-31 NOTE — PROGRESS NOTES
Santos Cleveland is a 77 y.o. male who was seen by synchronous (real-time) audio-video technology on 1/31/2022 for No chief complaint on file. Assessment & Plan:   Diagnoses and all orders for this visit:    1. B12 deficiency  -     VITAMIN B12; Future  -     cyanocobalamin (VITAMIN B12) 1,000 mcg/mL injection; INJECT 1ML INTRAMUSCULARLY EVERY 30 DAYS  - Presumed stable labs today, refill today    2. Hypertriglyceridemia  - Presumed stable labs today    3. 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 Screening PSA (prostate specific antigen)  -     PSA SCREENING (SCREENING); Future        I spent at least 16 minutes on this visit with this established patient. Subjective:   VV today   Gets lab work done from lab work done from cardiologist.   States he had a note on his B12 that he needed to speak with MD before further refills. Sees endocrinology for Type 1 diabetes    Review of chart, no recent labs  And patient states he is changing Endocrinologist      Prior to Admission medications    Medication Sig Start Date End Date Taking?  Authorizing Provider   cyanocobalamin (VITAMIN B12) 1,000 mcg/mL injection INJECT 1ML INTRAMUSCULARLY EVERY 30 DAYS 1/31/22  Yes Nicole Huber, NP   cyanocobalamin (VITAMIN B12) 1,000 mcg/mL injection INJECT 1ML INTRAMUSCULARLY EVERY 30 DAYS 1/31/22 1/31/22  Ankush Huber, NP   cyanocobalamin (VITAMIN B12) 1,000 mcg/mL injection INJECT 1ML INTRAMUSCULARLY EVERY 30 DAYS 5/17/21 1/31/22  Lucas Sales MD   insulin degludec (TRESIBA FLEXTOUCH U-200) 200 unit/mL (3 mL) inpn INJECT UP TO 40 UNITS SUBCUTANEOUSLY ONE TIME DAILY ONCE A DAY SUBCUTANEOUS 90 DAYS 1/3/19   Provider, Historical   FREESTYLE FOSTER 14 DAY READER misc AS DIRECTED 1 DOSE 4/9/19   Provider, Historical   FREESTYLE FOSTER 10 DAY SENSOR kit AS DIRECTED EVERY 14 DAYS SUBCUTANEOUS 28 DAYS 4/9/19   Provider, Historical   ACCU-CHEK GUIDE GLUCOSE METER misc USE AS DIRECTED 4/10/19   Provider, Historical   ACCU-CHEK GUIDE strip TESTS BLOOD SUGAR THREE TIMES A DAY IN VITRO 90 DAYS 4/10/19   Provider, Historical   ACCU-CHEK SOFTCLIX LANCETS misc USE TO TEST BLOOD SUGAR THREE TIMES A DAY XXX 80 DAYS 4/9/19   Provider, Historical   metoprolol tartrate (LOPRESSOR) 25 mg tablet Take  by mouth two (2) times a day. Provider, Historical   multivitamin (ONE A DAY) tablet Take 1 Tab by mouth daily. Provider, Historical   rosuvastatin (CRESTOR) 40 mg tablet Take 40 mg by mouth nightly. Provider, Historical   aspirin 81 mg chewable tablet Take 81 mg by mouth daily. Provider, Historical     Patient Active Problem List   Diagnosis Code    Lower back pain M54.50    Chronic venous insufficiency I87.2    Lupus ([facility] [address] M32.9    Lung nodule R91.1    Hypertriglyceridemia E78.1    Migraine headache with aura G43. 109    Polycythemia vera (HCC) D45    Joint pain of ankle and foot M25.579    Leg swelling M79.89    Neck pain M54.2    B12 deficiency E53.8       Review of Systems   Constitutional: Negative for chills, fever and malaise/fatigue. Eyes: Negative for blurred vision. Respiratory: Negative for cough and shortness of breath. Cardiovascular: Negative for chest pain, palpitations and leg swelling. Neurological: Negative for dizziness and headaches. Objective:   No flowsheet data found.      [INSTRUCTIONS:  \"[x]\" Indicates a positive item  \"[]\" Indicates a negative item  -- DELETE ALL ITEMS NOT EXAMINED]    Constitutional: [x] Appears well-developed and well-nourished [x] No apparent distress      [] Abnormal -     Mental status: [x] Alert and awake  [x] Oriented to person/place/time [x] Able to follow commands    [] Abnormal -     Eyes:   EOM    [x]  Normal    [] Abnormal -   Sclera  [x]  Normal [] Abnormal -          Discharge [x]  None visible   [] Abnormal -     HENT: [x] Normocephalic, atraumatic  [] Abnormal -   [x] Mouth/Throat: Mucous membranes are moist    External Ears [x] Normal  [] Abnormal -    Neck: [x] No visualized mass [] Abnormal -     Pulmonary/Chest: [x] Respiratory effort normal   [x] No visualized signs of difficulty breathing or respiratory distress        [] Abnormal -      Musculoskeletal:   [x] Normal gait with no signs of ataxia         [x] Normal range of motion of neck        [] Abnormal -     Neurological:        [x] No Facial Asymmetry (Cranial nerve 7 motor function) (limited exam due to video visit)          [x] No gaze palsy        [] Abnormal -          Skin:        [x] No significant exanthematous lesions or discoloration noted on facial skin         [] Abnormal -            Psychiatric:       [x] Normal Affect [] Abnormal -        [x] No Hallucinations    Other pertinent observable physical exam findings:-        We discussed the expected course, resolution and complications of the diagnosis(es) in detail. Medication risks, benefits, costs, interactions, and alternatives were discussed as indicated. I advised him to contact the office if his condition worsens, changes or fails to improve as anticipated. He expressed understanding with the diagnosis(es) and plan. Roxana Quarles Megha, was evaluated through a synchronous (real-time) audio-video encounter. The patient (or guardian if applicable) is aware that this is a billable service, which includes applicable co-pays. Verbal consent to proceed has been obtained. The visit was conducted pursuant to the emergency declaration under the [address] [place] [address] authority and the [employer] and [employer] Act. Patient identification was verified, and a caregiver was present when appropriate.  The patient was located at home in a state where the provider was licensed to provide care.       Elsy Mora NP

## 2022-03-18 PROBLEM — M54.2 NECK PAIN: Status: ACTIVE | Noted: 2018-08-27

## 2022-03-18 PROBLEM — M32.9 LUPUS (HCC): Status: ACTIVE | Noted: 2018-08-27

## 2022-03-19 PROBLEM — E53.8 B12 DEFICIENCY: Status: ACTIVE | Noted: 2018-10-03

## 2022-03-19 PROBLEM — E78.1 HYPERTRIGLYCERIDEMIA: Status: ACTIVE | Noted: 2018-08-27

## 2022-03-19 PROBLEM — R91.1 LUNG NODULE: Status: ACTIVE | Noted: 2018-08-27

## 2022-03-19 PROBLEM — M54.50 LOWER BACK PAIN: Status: ACTIVE | Noted: 2018-08-27

## 2022-03-20 PROBLEM — M25.579 JOINT PAIN OF ANKLE AND FOOT: Status: ACTIVE | Noted: 2018-08-27

## 2022-03-20 PROBLEM — G43.109 MIGRAINE HEADACHE WITH AURA: Status: ACTIVE | Noted: 2018-08-27

## 2022-03-20 PROBLEM — M79.89 LEG SWELLING: Status: ACTIVE | Noted: 2018-08-27

## 2022-03-20 PROBLEM — D45 POLYCYTHEMIA VERA (HCC): Status: ACTIVE | Noted: 2018-08-27

## 2022-03-20 PROBLEM — I87.2 CHRONIC VENOUS INSUFFICIENCY: Status: ACTIVE | Noted: 2018-08-27

## 2022-07-12 LAB — HEMOGLOBIN A1C: 8.4 %

## 2022-11-14 ENCOUNTER — OFFICE VISIT (OUTPATIENT)
Dept: FAMILY MEDICINE CLINIC | Age: 67
End: 2022-11-14
Payer: MEDICARE

## 2022-11-14 VITALS
SYSTOLIC BLOOD PRESSURE: 109 MMHG | HEIGHT: 71 IN | DIASTOLIC BLOOD PRESSURE: 68 MMHG | TEMPERATURE: 98.1 F | BODY MASS INDEX: 24.61 KG/M2 | WEIGHT: 175.8 LBS | RESPIRATION RATE: 16 BRPM | OXYGEN SATURATION: 97 % | HEART RATE: 71 BPM

## 2022-11-14 DIAGNOSIS — Z12.11 SCREENING FOR COLON CANCER: ICD-10-CM

## 2022-11-14 DIAGNOSIS — R35.1 BENIGN PROSTATIC HYPERPLASIA WITH NOCTURIA: ICD-10-CM

## 2022-11-14 DIAGNOSIS — N40.1 BENIGN PROSTATIC HYPERPLASIA WITH NOCTURIA: ICD-10-CM

## 2022-11-14 DIAGNOSIS — Z00.00 MEDICARE ANNUAL WELLNESS VISIT, SUBSEQUENT: Primary | ICD-10-CM

## 2022-11-14 DIAGNOSIS — N52.9 ERECTILE DYSFUNCTION, UNSPECIFIED ERECTILE DYSFUNCTION TYPE: ICD-10-CM

## 2022-11-14 DIAGNOSIS — J40 BRONCHITIS: ICD-10-CM

## 2022-11-14 DIAGNOSIS — Z11.59 ENCOUNTER FOR HEPATITIS C SCREENING TEST FOR LOW RISK PATIENT: ICD-10-CM

## 2022-11-14 DIAGNOSIS — E53.8 B12 DEFICIENCY: ICD-10-CM

## 2022-11-14 DIAGNOSIS — E10.59 TYPE 1 DIABETES MELLITUS WITH OTHER CIRCULATORY COMPLICATION (HCC): ICD-10-CM

## 2022-11-14 PROBLEM — D45 POLYCYTHEMIA VERA (HCC): Status: RESOLVED | Noted: 2018-08-27 | Resolved: 2022-11-14

## 2022-11-14 PROBLEM — E10.9 DIABETES MELLITUS TYPE I (HCC): Status: ACTIVE | Noted: 2022-11-14

## 2022-11-14 PROBLEM — M32.9 LUPUS (HCC): Status: RESOLVED | Noted: 2018-08-27 | Resolved: 2022-11-14

## 2022-11-14 PROCEDURE — 3052F HG A1C>EQUAL 8.0%<EQUAL 9.0%: CPT | Performed by: FAMILY MEDICINE

## 2022-11-14 PROCEDURE — G0439 PPPS, SUBSEQ VISIT: HCPCS | Performed by: FAMILY MEDICINE

## 2022-11-14 PROCEDURE — 3017F COLORECTAL CA SCREEN DOC REV: CPT | Performed by: FAMILY MEDICINE

## 2022-11-14 PROCEDURE — G8420 CALC BMI NORM PARAMETERS: HCPCS | Performed by: FAMILY MEDICINE

## 2022-11-14 PROCEDURE — 2022F DILAT RTA XM EVC RTNOPTHY: CPT | Performed by: FAMILY MEDICINE

## 2022-11-14 PROCEDURE — 1101F PT FALLS ASSESS-DOCD LE1/YR: CPT | Performed by: FAMILY MEDICINE

## 2022-11-14 PROCEDURE — G8536 NO DOC ELDER MAL SCRN: HCPCS | Performed by: FAMILY MEDICINE

## 2022-11-14 PROCEDURE — 99214 OFFICE O/P EST MOD 30 MIN: CPT | Performed by: FAMILY MEDICINE

## 2022-11-14 PROCEDURE — 1123F ACP DISCUSS/DSCN MKR DOCD: CPT | Performed by: FAMILY MEDICINE

## 2022-11-14 PROCEDURE — G8432 DEP SCR NOT DOC, RNG: HCPCS | Performed by: FAMILY MEDICINE

## 2022-11-14 PROCEDURE — G8427 DOCREV CUR MEDS BY ELIG CLIN: HCPCS | Performed by: FAMILY MEDICINE

## 2022-11-14 RX ORDER — AZITHROMYCIN 250 MG/1
TABLET, FILM COATED ORAL
Qty: 6 TABLET | Refills: 0 | Status: SHIPPED | OUTPATIENT
Start: 2022-11-14 | End: 2022-11-19

## 2022-11-14 RX ORDER — SILDENAFIL 100 MG/1
100 TABLET, FILM COATED ORAL
Qty: 30 TABLET | Refills: 1 | Status: SHIPPED | OUTPATIENT
Start: 2022-11-14

## 2022-11-14 RX ORDER — INSULIN LISPRO 100 [IU]/ML
INJECTION, SOLUTION INTRAVENOUS; SUBCUTANEOUS
COMMUNITY

## 2022-11-14 RX ORDER — ALBUTEROL SULFATE 90 UG/1
1 AEROSOL, METERED RESPIRATORY (INHALATION)
Qty: 18 G | Refills: 1 | Status: SHIPPED | OUTPATIENT
Start: 2022-11-14

## 2022-11-14 RX ORDER — METOPROLOL SUCCINATE 50 MG/1
50 TABLET, EXTENDED RELEASE ORAL 2 TIMES DAILY
COMMUNITY

## 2022-11-14 NOTE — PROGRESS NOTES
Chief Complaint   Patient presents with    Physical     1. Have you been to the ER, urgent care clinic since your last visit? Hospitalized since your last visit? No    2. Have you seen or consulted any other health care providers outside of the [address] [place] [address] since your last visit? Include any pap smears or colon screening.  No

## 2022-11-14 NOTE — PATIENT INSTRUCTIONS
Medicare Wellness Visit, Male    The best way to live healthy is to have a lifestyle where you eat a well-balanced diet, exercise regularly, limit alcohol use, and quit all forms of tobacco/nicotine, if applicable. Regular preventive services are another way to keep healthy. Preventive services (vaccines, screening tests, monitoring & exams) can help personalize your care plan, which helps you manage your own care. Screening tests can find health problems at the earliest stages, when they are easiest to treat. [facility] follows the current, evidence-based guidelines published by the [facility] ([facility]) when recommending preventive services for our patients. Because we follow these guidelines, sometimes recommendations change over time as research supports it. (For example, a prostate screening blood test is no longer routinely recommended for men with no symptoms). Of course, you and your doctor may decide to screen more often for some diseases, based on your risk and co-morbidities (chronic disease you are already diagnosed with). Preventive services for you include:  - Medicare offers their members a free annual wellness visit, which is time for you and your primary care provider to discuss and plan for your preventive service needs. Take advantage of this benefit every year!  -All adults over age 72 should receive the recommended pneumonia vaccines. Current USPSTF guidelines recommend a series of two vaccines for the best pneumonia protection.   -All adults should have a flu vaccine yearly and tetanus vaccine every 10 years.  -All adults age 48 and older should receive the shingles vaccines (series of two vaccines).        -All adults age 38-68 who are overweight should have a diabetes screening test once every three years.   -Other screening tests & preventive services for persons with diabetes include: an eye exam to screen for diabetic retinopathy, a kidney function test, a foot exam, and stricter control over your cholesterol.   -Cardiovascular screening for adults with routine risk involves an electrocardiogram (ECG) at intervals determined by the provider.   -Colorectal cancer screening should be done for adults age 54-65 with no increased risk factors for colorectal cancer. There are a number of acceptable methods of screening for this type of cancer. Each test has its own benefits and drawbacks. Discuss with your provider what is most appropriate for you during your annual wellness visit. The different tests include: colonoscopy (considered the best screening method), a fecal occult blood test, a fecal DNA test, and sigmoidoscopy.  -All adults born between [facility] should be screened once for Hepatitis C.  -An Abdominal Aortic Aneurysm (AAA) Screening is recommended for men age 73-68 who has ever smoked in their lifetime.      Here is a list of your current Health Maintenance items (your personalized list of preventive services) with a due date:  Health Maintenance Due   Topic Date Due    Hepatitis C Test  Never done    COVID-19 Vaccine (1) Never done    Pneumococcal Vaccine (1 - PCV) Never done    Eye Exam  Never done    DTaP/Tdap/Td  (1 - Tdap) Never done    Colorectal Screening  Never done    Shingles Vaccine (1 of 2) Never done    Albumin Urine Test  04/09/2020    Yearly Flu Vaccine (1) Never done

## 2022-11-14 NOTE — PROGRESS NOTES
Santos Cleveland  79 y.o. male  1955  [id number]  [facility] MEDICINE  Progress Note     Encounter Date: 11/14/2022    Assessment and Plan:     Encounter Diagnoses     ICD-10-CM ICD-9-CM   1. Medicare annual wellness visit, subsequent  Z00.00 V70.0   2. Screening for colon cancer  Z12.11 V76.51   3. Encounter for hepatitis C screening test for low risk patient  Z11.59 V73.89   4. Benign prostatic hyperplasia with nocturia  N40.1 600.01    R35.1 788.43   5. Type 1 diabetes mellitus with other circulatory complication (HCC)  E32.65 250.71     443.81   6. B12 deficiency  E53.8 266.2   7. Bronchitis  J40 490   8. Erectile dysfunction, unspecified erectile dysfunction type  N52.9 607.84       1. Medicare annual wellness visit, subsequent  updated    2. Screening for colon cancer  To Dr. Sheri Garcia  - Kristie Ervin    3. Encounter for hepatitis C screening test for low risk patient  screening  - HEPATITIS C AB; Future    4. Benign prostatic hyperplasia with nocturia  For PSA  - PSA, DIAGNOSTIC (PROSTATE SPECIFIC AG); Future    5. Type 1 diabetes mellitus with other circulatory complication ([facility] [address]  Sees Endocrinology  -  DIABETES FOOT EXAM    6. B12 deficiency  Check labs   Gives himself monthly shots  - VITAMIN B12; Future    7. Bronchitis  Check CXR. Behaving like bronchiolitis. - XR CHEST PA LAT; Future  - azithromycin (ZITHROMAX) 250 mg tablet; Take 2 tablets today, then take 1 tablet daily  Dispense: 6 Tablet; Refill: 0  - albuterol (PROVENTIL HFA, VENTOLIN HFA, PROAIR HFA) 90 mcg/actuation inhaler; Take 1 Puff by inhalation every six (6) hours as needed for Wheezing. Dispense: 18 g; Refill: 1    8. Erectile dysfunction, unspecified erectile dysfunction type  Sildenafil 100 mg 1 PO every day one hour prior to intercourse #30 + 1 refill, printed rx    I have discussed the diagnosis with the patient and the intended plan as seen in the above orders. he has expressed understanding.   The patient has received an after-visit summary and questions were answered concerning future plans. I have discussed medication side effects and warnings with the patient as well. Electronically Signed: Lisy Lopez MD    Current Medications after this visit     Current Outpatient Medications   Medication Sig    insulin lispro (HUMALOG) 100 unit/mL kwikpen by SubCUTAneous route. Breakfast 8 units  Lunch 10 units  Dinner 12 units    metoprolol succinate (TOPROL-XL) 50 mg XL tablet Take 50 mg by mouth two (2) times a day. azithromycin (ZITHROMAX) 250 mg tablet Take 2 tablets today, then take 1 tablet daily    albuterol (PROVENTIL HFA, VENTOLIN HFA, PROAIR HFA) 90 mcg/actuation inhaler Take 1 Puff by inhalation every six (6) hours as needed for Wheezing. sildenafil citrate (VIAGRA) 100 mg tablet Take 1 Tablet by mouth daily as needed for Erectile Dysfunction. Take one hour prior to intercourse    cyanocobalamin (VITAMIN B12) 1,000 mcg/mL injection INJECT 1ML INTRAMUSCULARLY EVERY 30 DAYS    insulin degludec (TRESIBA FLEXTOUCH U-200) 200 unit/mL (3 mL) inpn 38 Units by SubCUTAneous route daily. FREESTYLE FOSTER 14 DAY READER misc AS DIRECTED 1 DOSE    FREESTYLE FOSTER 10 DAY SENSOR kit AS DIRECTED EVERY 14 DAYS SUBCUTANEOUS 28 DAYS    ACCU-CHEK GUIDE GLUCOSE METER misc USE AS DIRECTED    ACCU-CHEK GUIDE strip TESTS BLOOD SUGAR THREE TIMES A DAY IN VITRO 90 DAYS    ACCU-CHEK SOFTCLIX LANCETS misc USE TO TEST BLOOD SUGAR THREE TIMES A DAY XXX 90 DAYS    multivitamin (ONE A DAY) tablet Take 1 Tab by mouth daily. rosuvastatin (CRESTOR) 40 mg tablet Take 40 mg by mouth nightly. aspirin 81 mg chewable tablet Take 81 mg by mouth daily. No current facility-administered medications for this visit.      Medications Discontinued During This Encounter   Medication Reason    metoprolol tartrate (LOPRESSOR) 25 mg tablet Not A Current Medication ~~~~~~~~~~~~~~~~~~~~~~~~~~~~~~~~~~~~~~~~~~~~~~~~~~~~~~~~~~~    Chief Complaint   Patient presents with    Physical     Had flu last week        History provided by patient  History of Present Illness   Regina Charles Cleveland is a 79 y.o. male who presents to clinic today for:  Physical (Had flu last week )    Thinks he got the flu from his grandkids last week  Still wheezing  Non smoker  No dyspnea  Cough until vomited  Temp to 101. COVID test was negative    B12  Needs level checked  Now gives himself shots    ED  Partial erections only  Asks what he can take. DM1  Sees Endocrine for that dx    Needs colonoscopy  Last done in his late 42's      Health Maintenance  Completed HM gaps at today's visit  Health Maintenance Due   Topic Date Due    Hepatitis C Screening  Never done    COVID-19 Vaccine (1) Never done    Pneumococcal 65+ years (1 - PCV) Never done    Eye Exam Retinal or Dilated  Never done    DTaP/Tdap/Td series (1 - Tdap) Never done    Colorectal Cancer Screening Combo  Never done    Shingrix Vaccine Age 50> (1 of 2) Never done    MICROALBUMIN Q1  04/09/2020    Flu Vaccine (1) Never done     Review of Systems   Review of Systems   Respiratory:  Negative for shortness of breath. Cardiovascular:  Negative for chest pain and leg swelling. Gastrointestinal:  Negative for blood in stool. Genitourinary:  Negative for hematuria. Psychiatric/Behavioral: Negative. Vitals/Objective:     Vitals:    11/14/22 1405   BP: 109/68   Pulse: 71   Resp: 16   Temp: 98.1 °F (36.7 °C)   TempSrc: Temporal   SpO2: 97%   Weight: 175 lb 12.8 oz (79.7 kg)   Height: 5' 11\" (1.803 m)     Body mass index is 24.52 kg/m². Wt Readings from Last 3 Encounters:   11/14/22 175 lb 12.8 oz (79.7 kg)   03/19/20 175 lb 3.2 oz (79.5 kg)   05/24/19 178 lb (80.7 kg)         Objective  Physical Exam  Vitals and nursing note reviewed. Constitutional:       Appearance: Normal appearance. He is not toxic-appearing.    HENT:      Head: Normocephalic and atraumatic. Right Ear: Tympanic membrane and ear canal normal.      Left Ear: Tympanic membrane and ear canal normal.      Mouth/Throat:      Pharynx: No oropharyngeal exudate or posterior oropharyngeal erythema. Cardiovascular:      Rate and Rhythm: Normal rate and regular rhythm. Heart sounds: Normal heart sounds. No murmur heard. No gallop. Pulmonary:      Effort: Pulmonary effort is normal. No respiratory distress. Breath sounds: Normal breath sounds. No wheezing, rhonchi or rales. Genitourinary:     Penis: Normal.       Testes: Normal.   Musculoskeletal:      Cervical back: No muscular tenderness. Lymphadenopathy:      Cervical: No cervical adenopathy. Neurological:      Mental Status: He is alert. Psychiatric:         Mood and Affect: Mood normal.         Behavior: Behavior normal.         Thought Content: Thought content normal.         Judgment: Judgment normal.      Diabetic Foot Exam:  Protective sensation is intact bilaterally. Pedal pulses are 2+ and normal bilaterally. L foot skin inspection:  normal skin and soft tissue with no gross edema or evidence of acute injury or foot ulcer  R foot skin inspection:  skin and soft tissue appear normal with no significant edema or evidence of acute injury or foot ulcer      No results found for this or any previous visit (from the past 24 hour(s)). Disposition     Follow-up and Dispositions    Return in about 3 weeks (around 12/5/2022) for Medication follow up, Asthma/COPD follow up. No future appointments. History   Patient's past medical, surgical and family histories were reviewed and updated.     Past Medical History:   Diagnosis Date    B12 deficiency     monthly shots    Coronary artery disease 1999    MI and Stent    Diabetes mellitus type 1 ([facility] [address]      Past Surgical History:   Procedure Laterality Date    HX CORONARY STENT PLACEMENT      HX ROTATOR CUFF REPAIR Right 2021    and rotator cuff surgery HX TONSILLECTOMY      [place] CARDIAC SURG PROCEDURE UNLIST  1999     No family history on file. Social History     Tobacco Use    Smoking status: Never    Smokeless tobacco: Never   Substance Use Topics    Alcohol use: Yes     Comment: Rarely    Drug use: No       Allergies     Allergies   Allergen Reactions    Sulfa (Sulfonamide Antibiotics) Nausea and Vomiting    Sulfasalazine Unknown (comments)     Noted: itching    Zetia [Ezetimibe] Not Reported This Time     None noted                     This is the Subsequent Medicare Annual Wellness Exam, performed 12 months or more after the Initial AWV or the last Subsequent AWV    I have reviewed the patient's medical history in detail and updated the computerized patient record. Assessment/Plan   Education and counseling provided:  Are appropriate based on today's review and evaluation  End-of-Life planning (with patient's consent)    1. Medicare annual wellness visit, subsequent  2. Screening for colon cancer  -     REFERRAL TO GASTROENTEROLOGY  3. Encounter for hepatitis C screening test for low risk patient  -     HEPATITIS C AB; Future  4. Benign prostatic hyperplasia with nocturia  -     PSA, DIAGNOSTIC (PROSTATE SPECIFIC AG); Future  5. Type 1 diabetes mellitus with other circulatory complication (HCC)  -      DIABETES FOOT EXAM  6. B12 deficiency  -     VITAMIN B12; Future  7. Bronchitis  -     XR CHEST PA LAT; Future  -     azithromycin (ZITHROMAX) 250 mg tablet; Take 2 tablets today, then take 1 tablet daily, Normal, Disp-6 Tablet, R-0  -     albuterol (PROVENTIL HFA, VENTOLIN HFA, PROAIR HFA) 90 mcg/actuation inhaler; Take 1 Puff by inhalation every six (6) hours as needed for Wheezing., Normal, Disp-18 g, R-1  8.  Erectile dysfunction, unspecified erectile dysfunction type     Depression Risk Factor Screening     3 most recent PHQ Screens 1/31/2022   Little interest or pleasure in doing things Not at all   Feeling down, depressed, irritable, or hopeless Not at all   Total Score PHQ 2 0       Alcohol & Drug Abuse Risk Screen    Do you average more than 1 drink per night or more than 7 drinks a week: No    In the past three months have you have had more than 4 drinks containing alcohol on one occasion: No    Non smoker        Functional Ability and Level of Safety    Hearing: Hearing is good. Vision:  up to date. Dental:  Up to date. Activities of Daily Living: The home contains: no safety equipment. Patient does total self care      Ambulation: with no difficulty     Fall Risk:  No flowsheet data found.    Abuse Screen:  Patient is not abused       Cognitive Screening    Has your family/caregiver stated any concerns about your memory: no     Cognitive Screening: Normal - interview    Health Maintenance Due     Health Maintenance Due   Topic Date Due    Hepatitis C Screening  Never done    COVID-19 Vaccine (1) Never done    Pneumococcal 65+ years (1 - PCV) Never done    Eye Exam Retinal or Dilated  Never done    DTaP/Tdap/Td series (1 - Tdap) Never done    Colorectal Cancer Screening Combo  Never done    Shingrix Vaccine Age 50> (1 of 2) Never done    MICROALBUMIN Q1  04/09/2020    Flu Vaccine (1) Never done       Patient Care Team   Patient Care Team:  Adriel Garcia MD as PCP - General (Family Medicine)  Adriel Garcia MD as PCP - [facility] Empaneled Provider    History     Patient Active Problem List   Diagnosis Code    Lower back pain M54.50    Chronic venous insufficiency I87.2    Lung nodule R91.1    Hypertriglyceridemia E78.1    Migraine headache with aura G43.109    Joint pain of ankle and foot M25.579    Leg swelling M79.89    Neck pain M54.2    B12 deficiency E53.8    Diabetes mellitus type I ([address] E10.9     Past Medical History:   Diagnosis Date    B12 deficiency     monthly shots    Coronary artery disease 1999    MI and Stent    Diabetes mellitus type 1 ([address]       Past Surgical History:   Procedure Laterality Date    HX CORONARY STENT PLACEMENT      HX ROTATOR CUFF REPAIR Right 2021    and rotator cuff surgery    HX TONSILLECTOMY      AR CARDIAC SURG PROCEDURE UNLIST  1999     Current Outpatient Medications   Medication Sig Dispense Refill    insulin lispro (HUMALOG) 100 unit/mL kwikpen by SubCUTAneous route. Breakfast 8 units  Lunch 10 units  Dinner 12 units      metoprolol succinate (TOPROL-XL) 50 mg XL tablet Take 50 mg by mouth two (2) times a day. azithromycin (ZITHROMAX) 250 mg tablet Take 2 tablets today, then take 1 tablet daily 6 Tablet 0    albuterol (PROVENTIL HFA, VENTOLIN HFA, PROAIR HFA) 90 mcg/actuation inhaler Take 1 Puff by inhalation every six (6) hours as needed for Wheezing. 18 g 1    sildenafil citrate (VIAGRA) 100 mg tablet Take 1 Tablet by mouth daily as needed for Erectile Dysfunction. Take one hour prior to intercourse 30 Tablet 1    cyanocobalamin (VITAMIN B12) 1,000 mcg/mL injection INJECT 1ML INTRAMUSCULARLY EVERY 30 DAYS 3 mL 1    insulin degludec (TRESIBA FLEXTOUCH U-200) 200 unit/mL (3 mL) inpn 38 Units by SubCUTAneous route daily. FREESTYLE FOSTER 14 DAY READER misc AS DIRECTED 1 DOSE  0    FREESTYLE FOSTER 10 DAY SENSOR kit AS DIRECTED EVERY 14 DAYS SUBCUTANEOUS 28 DAYS  11    ACCU-CHEK GUIDE GLUCOSE METER misc USE AS DIRECTED  0    ACCU-CHEK GUIDE strip TESTS BLOOD SUGAR THREE TIMES A DAY IN VITRO 90 DAYS  3    ACCU-CHEK SOFTCLIX LANCETS misc USE TO TEST BLOOD SUGAR THREE TIMES A DAY XXX 90 DAYS  1    multivitamin (ONE A DAY) tablet Take 1 Tab by mouth daily. rosuvastatin (CRESTOR) 40 mg tablet Take 40 mg by mouth nightly. aspirin 81 mg chewable tablet Take 81 mg by mouth daily. Allergies   Allergen Reactions    Sulfa (Sulfonamide Antibiotics) Nausea and Vomiting    Sulfasalazine Unknown (comments)     Noted: itching    Zetia [Ezetimibe] Not Reported This Time     None noted       No family history on file.   Social History     Tobacco Use    Smoking status: Never    Smokeless tobacco: Never   Substance Use Topics    Alcohol use: Yes     Comment: Rarely         Maxine Paul MD

## 2022-11-15 LAB
HCV AB SERPL QL IA: NONREACTIVE
PSA SERPL-MCNC: 2.9 NG/ML (ref 0.01–4)
VIT B12 SERPL-MCNC: 772 PG/ML (ref 193–986)

## 2022-11-17 ENCOUNTER — HOSPITAL ENCOUNTER (OUTPATIENT)
Dept: GENERAL RADIOLOGY | Age: 67
Discharge: HOME | End: 2022-11-17
Payer: MEDICARE

## 2022-11-17 DIAGNOSIS — J40 BRONCHITIS: ICD-10-CM

## 2022-11-17 PROCEDURE — 71046 X-RAY EXAM CHEST 2 VIEWS: CPT

## 2022-12-05 ENCOUNTER — OFFICE VISIT (OUTPATIENT)
Dept: FAMILY MEDICINE CLINIC | Age: 67
End: 2022-12-05
Payer: MEDICARE

## 2022-12-05 VITALS
HEIGHT: 71 IN | TEMPERATURE: 98.6 F | BODY MASS INDEX: 27.3 KG/M2 | DIASTOLIC BLOOD PRESSURE: 83 MMHG | HEART RATE: 58 BPM | SYSTOLIC BLOOD PRESSURE: 144 MMHG | WEIGHT: 195 LBS | OXYGEN SATURATION: 97 %

## 2022-12-05 DIAGNOSIS — Z23 ENCOUNTER FOR IMMUNIZATION: ICD-10-CM

## 2022-12-05 DIAGNOSIS — J40 BRONCHITIS: Primary | ICD-10-CM

## 2022-12-05 PROCEDURE — 90694 VACC AIIV4 NO PRSRV 0.5ML IM: CPT | Performed by: FAMILY MEDICINE

## 2022-12-05 PROCEDURE — 99213 OFFICE O/P EST LOW 20 MIN: CPT | Performed by: FAMILY MEDICINE

## 2022-12-05 PROCEDURE — G8427 DOCREV CUR MEDS BY ELIG CLIN: HCPCS | Performed by: FAMILY MEDICINE

## 2022-12-05 PROCEDURE — G8417 CALC BMI ABV UP PARAM F/U: HCPCS | Performed by: FAMILY MEDICINE

## 2022-12-05 PROCEDURE — 1101F PT FALLS ASSESS-DOCD LE1/YR: CPT | Performed by: FAMILY MEDICINE

## 2022-12-05 PROCEDURE — G8510 SCR DEP NEG, NO PLAN REQD: HCPCS | Performed by: FAMILY MEDICINE

## 2022-12-05 PROCEDURE — G8536 NO DOC ELDER MAL SCRN: HCPCS | Performed by: FAMILY MEDICINE

## 2022-12-05 PROCEDURE — 3017F COLORECTAL CA SCREEN DOC REV: CPT | Performed by: FAMILY MEDICINE

## 2022-12-05 PROCEDURE — G0008 ADMIN INFLUENZA VIRUS VAC: HCPCS | Performed by: FAMILY MEDICINE

## 2022-12-05 PROCEDURE — 1123F ACP DISCUSS/DSCN MKR DOCD: CPT | Performed by: FAMILY MEDICINE

## 2022-12-05 RX ORDER — METOPROLOL TARTRATE 50 MG/1
TABLET ORAL
COMMUNITY
Start: 2022-11-16 | End: 2022-12-05

## 2022-12-05 NOTE — PROGRESS NOTES
Identified pt with two pt identifiers. Reviewed record in preparation for visit and have obtained necessary documentation. All patient medications has been reviewed. Chief Complaint   Patient presents with    Follow-up     Additional information about chief complaint:    Visit Vitals  BP (!) 144/83 (BP 1 Location: Left upper arm, BP Patient Position: Sitting, BP Cuff Size: Large adult)   Pulse (!) 58   Temp 98.6 °F (37 °C) (Temporal)   Ht 5' 11\" (1.803 m)   Wt 195 lb (88.5 kg)   SpO2 97%   BMI 27.20 kg/m²       Health Maintenance Due   Topic    COVID-19 Vaccine (1)    Pneumococcal 65+ years (1 - PCV)    Eye Exam Retinal or Dilated     DTaP/Tdap/Td series (1 - Tdap)    Colorectal Cancer Screening Combo     Shingrix Vaccine Age 50> (1 of 2)    MICROALBUMIN Q1     Flu Vaccine (1)       1. Have you been to the ER, urgent care clinic since your last visit? Hospitalized since your last visit? no    2. Have you seen or consulted any other health care providers outside of the [address] [place] [address] since your last visit? Include any pap smears or colon screening.  no

## 2022-12-05 NOTE — PROGRESS NOTES
Calderon Sox Day  79 y.o. male  1955  [id number]  [facility]  Progress Note     Encounter Date: 12/5/2022    Assessment and Plan:     Encounter Diagnoses     ICD-10-CM ICD-9-CM   1. Bronchitis  J40 490   2. Encounter for immunization  Z23 V03.89       1. Bronchitis-resolved  FU if worsening respiratory sx or recurrence of bronchitis    2. Encounter for immunization  given  - INFLUENZA, FLUAD, (AGE 72 Y+), IM, PF, 0.5 ML    I have discussed the diagnosis with the patient and the intended plan as seen in the above orders. he has expressed understanding. The patient has received an after-visit summary and questions were answered concerning future plans. I have discussed medication side effects and warnings with the patient as well. Electronically Signed: Jackie Saavedra MD    Current Medications after this visit     Current Outpatient Medications   Medication Sig    insulin lispro (HUMALOG) 100 unit/mL kwikpen by SubCUTAneous route. Breakfast 8 units  Lunch 10 units  Dinner 12 units    metoprolol succinate (TOPROL-XL) 50 mg XL tablet Take 50 mg by mouth two (2) times a day. albuterol (PROVENTIL HFA, VENTOLIN HFA, PROAIR HFA) 90 mcg/actuation inhaler Take 1 Puff by inhalation every six (6) hours as needed for Wheezing. sildenafil citrate (VIAGRA) 100 mg tablet Take 1 Tablet by mouth daily as needed for Erectile Dysfunction. Take one hour prior to intercourse    cyanocobalamin (VITAMIN B12) 1,000 mcg/mL injection INJECT 1ML INTRAMUSCULARLY EVERY 30 DAYS    insulin degludec (TRESIBA) 200 unit/mL (3 mL) inpn pen 38 Units by SubCUTAneous route daily.     FREESTYLE FOSTER 14 DAY READER misc AS DIRECTED 1 DOSE    FREESTYLE FOSTER 10 DAY SENSOR kit AS DIRECTED EVERY 14 DAYS SUBCUTANEOUS 28 DAYS    ACCU-CHEK GUIDE GLUCOSE METER misc USE AS DIRECTED    ACCU-CHEK GUIDE strip TESTS BLOOD SUGAR THREE TIMES A DAY IN VITRO 90 DAYS    ACCU-CHEK SOFTCLIX LANCETS misc USE TO TEST BLOOD SUGAR THREE TIMES A DAY XXX 90 DAYS    multivitamin (ONE A DAY) tablet Take 1 Tab by mouth daily. rosuvastatin (CRESTOR) 40 mg tablet Take 40 mg by mouth nightly. aspirin 81 mg chewable tablet Take 81 mg by mouth daily. No current facility-administered medications for this visit. Medications Discontinued During This Encounter   Medication Reason    metoprolol tartrate (LOPRESSOR) 50 mg tablet Not A Current Medication     ~~~~~~~~~~~~~~~~~~~~~~~~~~~~~~~~~~~~~~~~~~~~~~~~~~~~~~~~~~~    Chief Complaint   Patient presents with    Follow-up       History provided by patient  History of Present Illness   Manasa Cleveland is a 79 y.o. male who presents to clinic today for:  Follow-up    Took antibiotics  Now breathing is back to normal  No wheezing is gone  Better  Took all 5 days of antibiotics. Health Maintenance  Completed HM gaps at today's visit  Health Maintenance Due   Topic Date Due    COVID-19 Vaccine (1) Never done    Pneumococcal 65+ years (1 - PCV) Never done    Eye Exam Retinal or Dilated  Never done    DTaP/Tdap/Td series (1 - Tdap) Never done    Colorectal Cancer Screening Combo  Never done    Shingrix Vaccine Age 50> (1 of 2) Never done    MICROALBUMIN Q1  04/09/2020    Flu Vaccine (1) Never done     Review of Systems   Review of Systems   Constitutional:  Negative for chills and fever. Respiratory:  Negative for cough, shortness of breath and wheezing. Cardiovascular:  Negative for chest pain. Psychiatric/Behavioral: Negative. Vitals/Objective:     Vitals:    12/05/22 1145   BP: (!) 144/83   Pulse: (!) 58   Temp: 98.6 °F (37 °C)   TempSrc: Temporal   SpO2: 97%   Weight: 195 lb (88.5 kg)   Height: 5' 11\" (1.803 m)     Body mass index is 27.2 kg/m². Wt Readings from Last 3 Encounters:   12/05/22 195 lb (88.5 kg)   11/14/22 175 lb 12.8 oz (79.7 kg)   03/19/20 175 lb 3.2 oz (79.5 kg)         Objective  Physical Exam  Vitals and nursing note reviewed.    Constitutional:       Appearance: Normal appearance. He is not toxic-appearing. HENT:      Head: Normocephalic and atraumatic. Nose: Nose normal.      Mouth/Throat:      Pharynx: No oropharyngeal exudate or posterior oropharyngeal erythema. Cardiovascular:      Rate and Rhythm: Normal rate and regular rhythm. Heart sounds: Normal heart sounds. No murmur heard. No gallop. Pulmonary:      Effort: Pulmonary effort is normal. No respiratory distress. Breath sounds: Normal breath sounds. No wheezing, rhonchi or rales. Musculoskeletal:      Cervical back: No muscular tenderness. Lymphadenopathy:      Cervical: No cervical adenopathy. Neurological:      Mental Status: He is alert. Psychiatric:         Mood and Affect: Mood normal.         Behavior: Behavior normal.         Thought Content: Thought content normal.         Judgment: Judgment normal.       No results found for this or any previous visit (from the past 24 hour(s)). Disposition     Follow-up and Dispositions    Return in about 1 year (around 12/5/2023) for Annual exam.       No future appointments. History   Patient's past medical, surgical and family histories were reviewed and updated. Past Medical History:   Diagnosis Date    B12 deficiency     monthly shots    Coronary artery disease 1999    MI and Stent    Diabetes mellitus type 1 ([facility] [address]      Past Surgical History:   Procedure Laterality Date    HX CORONARY STENT PLACEMENT      HX ROTATOR CUFF REPAIR Right 2021    and rotator cuff surgery    HX [address]     History reviewed. No pertinent family history.   Social History     Tobacco Use    Smoking status: Never    Smokeless tobacco: Never   Vaping Use    Vaping Use: Never used   Substance Use Topics    Alcohol use: Yes     Comment: Rarely    Drug use: No       Allergies     Allergies   Allergen Reactions    Sulfa (Sulfonamide Antibiotics) Nausea and Vomiting    Sulfasalazine Unknown (comments)     Noted: itching    Zetia [Ezetimibe] Not Reported This Time     None noted

## 2023-01-11 DIAGNOSIS — E53.8 B12 DEFICIENCY: ICD-10-CM

## 2023-01-16 RX ORDER — CYANOCOBALAMIN 1000 UG/ML
INJECTION, SOLUTION INTRAMUSCULAR; SUBCUTANEOUS
Qty: 3 ML | Refills: 1 | Status: SHIPPED | OUTPATIENT
Start: 2023-01-16

## 2023-05-18 RX ORDER — LANCETS
EACH MISCELLANEOUS
COMMUNITY
Start: 2019-04-09

## 2023-05-18 RX ORDER — ASPIRIN 81 MG/1
81 TABLET, CHEWABLE ORAL DAILY
COMMUNITY

## 2023-05-18 RX ORDER — CYANOCOBALAMIN 1000 UG/ML
INJECTION, SOLUTION INTRAMUSCULAR; SUBCUTANEOUS
COMMUNITY
Start: 2023-01-16

## 2023-05-18 RX ORDER — SILDENAFIL 100 MG/1
100 TABLET, FILM COATED ORAL DAILY PRN
COMMUNITY
Start: 2022-11-14

## 2023-05-18 RX ORDER — METOPROLOL SUCCINATE 50 MG/1
50 TABLET, EXTENDED RELEASE ORAL 2 TIMES DAILY
COMMUNITY

## 2023-05-18 RX ORDER — ROSUVASTATIN CALCIUM 40 MG/1
40 TABLET, COATED ORAL NIGHTLY
COMMUNITY

## 2023-05-18 RX ORDER — ALBUTEROL SULFATE 90 UG/1
1 AEROSOL, METERED RESPIRATORY (INHALATION) EVERY 6 HOURS PRN
COMMUNITY
Start: 2022-11-14

## 2023-08-16 RX ORDER — INSULIN LISPRO 100 [IU]/ML
INJECTION, SOLUTION INTRAVENOUS; SUBCUTANEOUS
COMMUNITY
Start: 2023-07-30

## 2023-08-16 RX ORDER — BLOOD SUGAR DIAGNOSTIC
STRIP MISCELLANEOUS
COMMUNITY
Start: 2023-06-28

## 2023-08-16 NOTE — TELEPHONE ENCOUNTER
Message to Front:    Please call & schedule an appointment for B12 refill & B12 Labs.     Last Labs: 11/14/22  Last appt:  12/5/22    Thank you,  zuhair

## 2023-08-17 ENCOUNTER — TELEPHONE (OUTPATIENT)
Facility: CLINIC | Age: 68
End: 2023-08-17

## 2023-08-18 LAB — HBA1C MFR BLD HPLC: 7.9 %

## 2023-08-21 PROBLEM — Z95.5 HISTORY OF HEART ARTERY STENT: Status: ACTIVE | Noted: 2023-08-21

## 2023-08-21 PROBLEM — I25.10 CAD (CORONARY ARTERY DISEASE): Chronic | Status: ACTIVE | Noted: 2023-08-21

## 2023-08-21 RX ORDER — CYANOCOBALAMIN 1000 UG/ML
INJECTION, SOLUTION INTRAMUSCULAR; SUBCUTANEOUS
Qty: 1 EACH | Refills: 1 | Status: SHIPPED | OUTPATIENT
Start: 2023-08-21 | End: 2023-08-22 | Stop reason: SDUPTHER

## 2023-08-21 ASSESSMENT — ENCOUNTER SYMPTOMS
SINUS PAIN: 0
CHEST TIGHTNESS: 0
BLOOD IN STOOL: 0
NAUSEA: 0
CONSTIPATION: 0
COUGH: 0
ABDOMINAL PAIN: 0
DIARRHEA: 0
SHORTNESS OF BREATH: 0

## 2023-08-22 RX ORDER — CYANOCOBALAMIN 1000 UG/ML
INJECTION, SOLUTION INTRAMUSCULAR; SUBCUTANEOUS
Qty: 1 EACH | Refills: 1 | Status: SHIPPED | OUTPATIENT
Start: 2023-08-22

## 2023-08-22 NOTE — PROGRESS NOTES
Assessment/Plan:     Diagnoses and all orders for this visit:    1. B12 deficiency  - Will check levels today, no new sx  - Vitamin B12 & Folate; Future  - CBC with Auto Differential; Future    2. Wheezing  - Wheezing relieved with in-house albuterol treatement and O2 sats up to 98% from 94%. - Will send albuterol inhaler for symptoms. Advised copious hydration and supportive care for potential virus.  - Encouraged to return to clinic if symptoms persist or worsen. - albuterol (PROVENTIL) (2.5 MG/3ML) 0.083% nebulizer solution 2.5 mg  - albuterol sulfate HFA (VENTOLIN HFA) 108 (90 Base) MCG/ACT inhaler; Inhale 2 puffs into the lungs 4 times daily as needed for Wheezing  Dispense: 18 g; Refill: 0    3. Type 1 diabetes mellitus with nephropathy ([address])  Will continue follow up with Dr. Varsha Bhatt and will request records  Reports stable and no hypoglycemia  Will check labs today as none since 11/2022  - Comprehensive Metabolic Panel; Future  - Microalbumin / Creatinine Urine Ratio; Future  - Hemoglobin A1C; Future    4. Hypertriglyceridemia  - Comprehensive Metabolic Panel; Future  - Lipid Panel; Future    5. History of heart attack  Denies any new symptoms        Return in about 4 months (around 12/23/2023) for AWV with Dr. Fabian Westbrook. Discussed expected course/resolution/complications of diagnosis in detail with patient. Medication risks/benefits/costs/interactions/alternatives discussed with patient. Pt expressed understanding with the diagnosis and plan      Subjective:      CC  Lisette Brown is a 79 y.o. male who presents for had concerns including Nasal Congestion (Patient presents for a follow up on vitamin B-12 deficiency and new symptoms headaches, nasal congestion and fatigue. He has not been tested for any viruses. He has tried OTC meds with mild relief. ). HPI  Lisette Brown is a 79 y.o. male who presents for follow up of b12 deficiency.  Patient is known for T1DM, diagnosed > 10 years ago and followed by

## 2023-08-22 NOTE — PATIENT INSTRUCTIONS
You had a significant wheeze today during your visit that improved after an in office albuterol treatment. I am sending an inhaler - please use as needed for wheeze and rinse your mouth after. You likely have a viral upper respiratory tract infection. Viruses cause many illnesses, such as the common cold, flu, fever, rashes, AND the diarrhea, nausea, and vomiting that are symptoms of a stomach infection. You may wonder if antibiotic medicines could make you feel better. But antibiotics only treat infections caused by bacteria. They don't work on viruses. The good news is that viral infections usually aren't serious. Most will go away in a few days without medical treatment. In the meantime, there are a few things you can do to make yourself more comfortable. Rest if you feel tired  Take an over-the-counter pain medicine if needed, such as acetaminophen (Tylenol), or an NSAID such as ibuprofen (Advil, Motrin), or naproxen (Aleve). Read and follow all instructions on the label. Do not give aspirin to anyone younger than 20. It has been linked to Reye syndrome, a serious illness. Be careful when taking over-the-counter cold or flu medicines and Tylenol at the same time. Many of these medicines have acetaminophen, which is Tylenol. Read the labels to make sure that you are not taking more than the recommended dose. Too much acetaminophen (Tylenol) can be harmful. Mucinex DM/Robitussin DM day and night - suppress cough and thin mucus  Sudafed (ask pharmacist for this) during day - clears head, allows airflow, but can keep you awake at night. Saline nasal spray frequently, or saline sinus rinse daily. Drink plenty of fluids. Stay home from work, school, and other public places while you have a fever and for 24 hours after your fever. Call 911 anytime you think you may need emergency care. For example, call if:    You have severe trouble breathing. You passed out (lost consciousness).     Call the office

## 2023-08-23 ENCOUNTER — OFFICE VISIT (OUTPATIENT)
Facility: CLINIC | Age: 68
End: 2023-08-23
Payer: MEDICARE

## 2023-08-23 VITALS
TEMPERATURE: 97.6 F | OXYGEN SATURATION: 94 % | WEIGHT: 192.63 LBS | DIASTOLIC BLOOD PRESSURE: 76 MMHG | HEIGHT: 71 IN | RESPIRATION RATE: 17 BRPM | HEART RATE: 66 BPM | BODY MASS INDEX: 26.97 KG/M2 | SYSTOLIC BLOOD PRESSURE: 127 MMHG

## 2023-08-23 DIAGNOSIS — E10.21 TYPE 1 DIABETES MELLITUS WITH NEPHROPATHY (HCC): ICD-10-CM

## 2023-08-23 DIAGNOSIS — E10.21 TYPE 1 DIABETES MELLITUS WITH NEPHROPATHY (HCC): Primary | ICD-10-CM

## 2023-08-23 DIAGNOSIS — E78.1 HYPERTRIGLYCERIDEMIA: ICD-10-CM

## 2023-08-23 DIAGNOSIS — E53.8 B12 DEFICIENCY: ICD-10-CM

## 2023-08-23 DIAGNOSIS — I25.2 HISTORY OF HEART ATTACK: ICD-10-CM

## 2023-08-23 DIAGNOSIS — R06.2 WHEEZING: ICD-10-CM

## 2023-08-23 PROCEDURE — 99214 OFFICE O/P EST MOD 30 MIN: CPT | Performed by: FAMILY MEDICINE

## 2023-08-23 PROCEDURE — 1123F ACP DISCUSS/DSCN MKR DOCD: CPT | Performed by: FAMILY MEDICINE

## 2023-08-23 RX ORDER — ALBUTEROL SULFATE 90 UG/1
2 AEROSOL, METERED RESPIRATORY (INHALATION) 4 TIMES DAILY PRN
Qty: 18 G | Refills: 0 | Status: SHIPPED | OUTPATIENT
Start: 2023-08-23

## 2023-08-23 RX ORDER — ALBUTEROL SULFATE 2.5 MG/3ML
2.5 SOLUTION RESPIRATORY (INHALATION) ONCE
Status: SHIPPED | OUTPATIENT
Start: 2023-08-23

## 2023-08-23 SDOH — ECONOMIC STABILITY: HOUSING INSECURITY
IN THE LAST 12 MONTHS, WAS THERE A TIME WHEN YOU DID NOT HAVE A STEADY PLACE TO SLEEP OR SLEPT IN A SHELTER (INCLUDING NOW)?: NO

## 2023-08-23 SDOH — ECONOMIC STABILITY: FOOD INSECURITY: WITHIN THE PAST 12 MONTHS, THE FOOD YOU BOUGHT JUST DIDN'T LAST AND YOU DIDN'T HAVE MONEY TO GET MORE.: NEVER TRUE

## 2023-08-23 SDOH — ECONOMIC STABILITY: INCOME INSECURITY: HOW HARD IS IT FOR YOU TO PAY FOR THE VERY BASICS LIKE FOOD, HOUSING, MEDICAL CARE, AND HEATING?: NOT HARD AT ALL

## 2023-08-23 SDOH — ECONOMIC STABILITY: FOOD INSECURITY: WITHIN THE PAST 12 MONTHS, YOU WORRIED THAT YOUR FOOD WOULD RUN OUT BEFORE YOU GOT MONEY TO BUY MORE.: NEVER TRUE

## 2023-08-23 ASSESSMENT — PATIENT HEALTH QUESTIONNAIRE - PHQ9
SUM OF ALL RESPONSES TO PHQ QUESTIONS 1-9: 0
SUM OF ALL RESPONSES TO PHQ9 QUESTIONS 1 & 2: 0
2. FEELING DOWN, DEPRESSED OR HOPELESS: 0
SUM OF ALL RESPONSES TO PHQ QUESTIONS 1-9: 0
SUM OF ALL RESPONSES TO PHQ QUESTIONS 1-9: 0
1. LITTLE INTEREST OR PLEASURE IN DOING THINGS: 0
SUM OF ALL RESPONSES TO PHQ QUESTIONS 1-9: 0

## 2023-08-23 ASSESSMENT — ENCOUNTER SYMPTOMS: WHEEZING: 1

## 2023-08-23 NOTE — PROGRESS NOTES
dentified pt with two pt identifiers(name and ). Chief Complaint   Patient presents with    Nasal Congestion     Patient presents for a follow up on vitamin B-12 deficiency and new symptoms headaches, nasal congestion and fatigue. He has not been tested for any viruses. He has tried OTC meds with mild relief. Health Maintenance Due   Topic    COVID-19 Vaccine (1)    Pneumococcal 65+ years Vaccine (1 - PCV)    Diabetic Alb to Cr ratio (uACR) test     Diabetic retinal exam     DTaP/Tdap/Td vaccine (1 - Tdap)    Colorectal Cancer Screen     Shingles vaccine (1 of 2)    Lipids     GFR test (Diabetes, CKD 3-4, OR last GFR 15-59)     Flu vaccine (1)       Wt Readings from Last 3 Encounters:   22 195 lb (88.5 kg)   22 175 lb 12.8 oz (79.7 kg)     Temp Readings from Last 3 Encounters:   No data found for Temp     BP Readings from Last 3 Encounters:   22 (!) 144/83   22 109/68     Pulse Readings from Last 3 Encounters:   22 58   22 71           Coordination of Care Questionnaire:  :   1. \"Have you been to the ER, urgent care clinic since your last visit? Hospitalized since your last visit? \" no    2. \"Have you seen or consulted any other health care providers outside of the [address] [place] [address] since your last visit? \" no     3. For patients aged 43-73: Has the patient had a colonoscopy / FIT/ Cologuard? due    3) Do you have an Advance Directive on file? no  Are you interested in receiving information about Advance Directives? no    Patient is accompanied by self I have received verbal consent from Moraima Brown to discuss any/all medical information while they are present in the room.

## 2023-08-24 LAB
ALBUMIN SERPL-MCNC: 4 G/DL (ref 3.5–5)
ALBUMIN/GLOB SERPL: 1.3 (ref 1.1–2.2)
ALP SERPL-CCNC: 69 U/L (ref 45–117)
ALT SERPL-CCNC: 40 U/L (ref 12–78)
ANION GAP SERPL CALC-SCNC: 3 MMOL/L (ref 5–15)
AST SERPL-CCNC: 26 U/L (ref 15–37)
BASOPHILS # BLD: 0 K/UL (ref 0–0.1)
BASOPHILS NFR BLD: 0 % (ref 0–1)
BILIRUB SERPL-MCNC: 0.6 MG/DL (ref 0.2–1)
BUN SERPL-MCNC: 19 MG/DL (ref 6–20)
BUN/CREAT SERPL: 16 (ref 12–20)
CALCIUM SERPL-MCNC: 9.2 MG/DL (ref 8.5–10.1)
CHLORIDE SERPL-SCNC: 106 MMOL/L (ref 97–108)
CHOLEST SERPL-MCNC: 138 MG/DL
CO2 SERPL-SCNC: 33 MMOL/L (ref 21–32)
CREAT SERPL-MCNC: 1.19 MG/DL (ref 0.7–1.3)
CREAT UR-MCNC: 275 MG/DL
DIFFERENTIAL METHOD BLD: ABNORMAL
EOSINOPHIL # BLD: 1.1 K/UL (ref 0–0.4)
EOSINOPHIL NFR BLD: 7 % (ref 0–7)
ERYTHROCYTE [DISTWIDTH] IN BLOOD BY AUTOMATED COUNT: 13.2 % (ref 11.5–14.5)
EST. AVERAGE GLUCOSE BLD GHB EST-MCNC: 166 MG/DL
FOLATE SERPL-MCNC: 31.8 NG/ML (ref 5–21)
GLOBULIN SER CALC-MCNC: 3.2 G/DL (ref 2–4)
GLUCOSE SERPL-MCNC: 111 MG/DL (ref 65–100)
HBA1C MFR BLD: 7.4 % (ref 4–5.6)
HCT VFR BLD AUTO: 50.3 % (ref 36.6–50.3)
HDLC SERPL-MCNC: 60 MG/DL
HDLC SERPL: 2.3 (ref 0–5)
HGB BLD-MCNC: 16.1 G/DL (ref 12.1–17)
IMM GRANULOCYTES # BLD AUTO: 0 K/UL (ref 0–0.04)
IMM GRANULOCYTES NFR BLD AUTO: 0 % (ref 0–0.5)
LDLC SERPL CALC-MCNC: 64 MG/DL (ref 0–100)
LYMPHOCYTES # BLD: 5.7 K/UL (ref 0.8–3.5)
LYMPHOCYTES NFR BLD: 37 % (ref 12–49)
MCH RBC QN AUTO: 29.2 PG (ref 26–34)
MCHC RBC AUTO-ENTMCNC: 32 G/DL (ref 30–36.5)
MCV RBC AUTO: 91.3 FL (ref 80–99)
MICROALBUMIN UR-MCNC: 2.76 MG/DL
MICROALBUMIN/CREAT UR-RTO: 10 MG/G (ref 0–30)
MONOCYTES # BLD: 1.1 K/UL (ref 0–1)
MONOCYTES NFR BLD: 7 % (ref 5–13)
NEUTS SEG # BLD: 7.4 K/UL (ref 1.8–8)
NEUTS SEG NFR BLD: 49 % (ref 32–75)
NRBC # BLD: 0 K/UL (ref 0–0.01)
NRBC BLD-RTO: 0 PER 100 WBC
PLATELET # BLD AUTO: 222 K/UL (ref 150–400)
PMV BLD AUTO: 10.7 FL (ref 8.9–12.9)
POTASSIUM SERPL-SCNC: 4.4 MMOL/L (ref 3.5–5.1)
PROT SERPL-MCNC: 7.2 G/DL (ref 6.4–8.2)
RBC # BLD AUTO: 5.51 M/UL (ref 4.1–5.7)
RBC MORPH BLD: ABNORMAL
SODIUM SERPL-SCNC: 142 MMOL/L (ref 136–145)
TRIGL SERPL-MCNC: 70 MG/DL
VIT B12 SERPL-MCNC: 500 PG/ML (ref 193–986)
VLDLC SERPL CALC-MCNC: 14 MG/DL
WBC # BLD AUTO: 15.3 K/UL (ref 4.1–11.1)

## 2023-08-24 NOTE — RESULT ENCOUNTER NOTE
Reviewed labs with abnormal findings. Please call patient and let him know his white cells are high, like he is fighting an infection. I'd like you to check on him and see how the wheezing is going or whether his symptoms have progressed. Thanks!

## 2023-08-31 DIAGNOSIS — E53.8 DEFICIENCY OF OTHER SPECIFIED B GROUP VITAMINS: ICD-10-CM

## 2023-09-14 ENCOUNTER — PATIENT MESSAGE (OUTPATIENT)
Dept: OTHER | Facility: CLINIC | Age: 68
End: 2023-09-14

## 2023-09-15 NOTE — TELEPHONE ENCOUNTER
PCP: Alex Ambrose MD    Last appt: [unfilled]  No future appointments.     Requested Prescriptions     Pending Prescriptions Disp Refills    cyanocobalamin 1000 MCG/ML injection [Pharmacy Med Name: CYANOCOBALAMIN 1,000 MCG/ML VL] 3 mL 1     Sig: INJECT 1ML INTRAMUSCULARLY EVERY 30 DAYS       Prior labs and Blood pressures:  BP Readings from Last 3 Encounters:   08/23/23 127/76   12/05/22 (!) 144/83   11/14/22 109/68     Lab Results   Component Value Date/Time     08/23/2023 12:01 PM    K 4.4 08/23/2023 12:01 PM     08/23/2023 12:01 PM    CO2 33 08/23/2023 12:01 PM    BUN 19 08/23/2023 12:01 PM    GFRAA >60 02/16/2022 08:45 AM     Lab Results   Component Value Date/Time    HBA1C 8.4 07/11/2022 12:00 AM     Lab Results   Component Value Date/Time    CHOL 138 08/23/2023 12:01 PM    HDL 60 08/23/2023 12:01 PM     No results found for: \"VITD3\", \"VD3RIA\"    No results found for: \"TSH\", \"TSH2\", \"TSH3\"

## 2023-09-16 RX ORDER — CYANOCOBALAMIN 1000 UG/ML
INJECTION, SOLUTION INTRAMUSCULAR; SUBCUTANEOUS
Qty: 3 ML | Refills: 1 | Status: SHIPPED | OUTPATIENT
Start: 2023-09-16

## 2023-12-30 LAB
ESTIMATED AVERAGE GLUCOSE: NORMAL
HBA1C MFR BLD: 9.2 %

## 2024-03-18 SDOH — HEALTH STABILITY: PHYSICAL HEALTH: ON AVERAGE, HOW MANY DAYS PER WEEK DO YOU ENGAGE IN MODERATE TO STRENUOUS EXERCISE (LIKE A BRISK WALK)?: 2 DAYS

## 2024-03-18 SDOH — HEALTH STABILITY: PHYSICAL HEALTH: ON AVERAGE, HOW MANY MINUTES DO YOU ENGAGE IN EXERCISE AT THIS LEVEL?: 60 MIN

## 2024-03-18 ASSESSMENT — LIFESTYLE VARIABLES
HOW OFTEN DO YOU HAVE SIX OR MORE DRINKS ON ONE OCCASION: 1
HOW MANY STANDARD DRINKS CONTAINING ALCOHOL DO YOU HAVE ON A TYPICAL DAY: 1 OR 2
HOW OFTEN DO YOU HAVE A DRINK CONTAINING ALCOHOL: MONTHLY OR LESS
HOW OFTEN DO YOU HAVE A DRINK CONTAINING ALCOHOL: 2
HOW MANY STANDARD DRINKS CONTAINING ALCOHOL DO YOU HAVE ON A TYPICAL DAY: 1

## 2024-03-18 ASSESSMENT — PATIENT HEALTH QUESTIONNAIRE - PHQ9
1. LITTLE INTEREST OR PLEASURE IN DOING THINGS: NOT AT ALL
SUM OF ALL RESPONSES TO PHQ QUESTIONS 1-9: 0
SUM OF ALL RESPONSES TO PHQ9 QUESTIONS 1 & 2: 0
2. FEELING DOWN, DEPRESSED OR HOPELESS: NOT AT ALL
SUM OF ALL RESPONSES TO PHQ QUESTIONS 1-9: 0

## 2024-03-21 ENCOUNTER — OFFICE VISIT (OUTPATIENT)
Facility: CLINIC | Age: 69
End: 2024-03-21

## 2024-03-21 VITALS
WEIGHT: 193.8 LBS | HEART RATE: 59 BPM | RESPIRATION RATE: 20 BRPM | OXYGEN SATURATION: 96 % | DIASTOLIC BLOOD PRESSURE: 74 MMHG | SYSTOLIC BLOOD PRESSURE: 134 MMHG | BODY MASS INDEX: 27.13 KG/M2 | TEMPERATURE: 97.5 F | HEIGHT: 71 IN

## 2024-03-21 DIAGNOSIS — E53.8 B12 DEFICIENCY: ICD-10-CM

## 2024-03-21 DIAGNOSIS — I25.2 HISTORY OF HEART ATTACK: ICD-10-CM

## 2024-03-21 DIAGNOSIS — R35.1 BENIGN PROSTATIC HYPERPLASIA WITH NOCTURIA: ICD-10-CM

## 2024-03-21 DIAGNOSIS — Z12.11 SCREENING FOR COLON CANCER: ICD-10-CM

## 2024-03-21 DIAGNOSIS — Z23 ENCOUNTER FOR IMMUNIZATION: ICD-10-CM

## 2024-03-21 DIAGNOSIS — N52.9 ERECTILE DYSFUNCTION, UNSPECIFIED ERECTILE DYSFUNCTION TYPE: ICD-10-CM

## 2024-03-21 DIAGNOSIS — M65.342 TRIGGER RING FINGER OF LEFT HAND: ICD-10-CM

## 2024-03-21 DIAGNOSIS — E10.21 TYPE 1 DIABETES MELLITUS WITH NEPHROPATHY (HCC): ICD-10-CM

## 2024-03-21 DIAGNOSIS — N40.1 BENIGN PROSTATIC HYPERPLASIA WITH NOCTURIA: ICD-10-CM

## 2024-03-21 DIAGNOSIS — E78.1 HYPERTRIGLYCERIDEMIA: ICD-10-CM

## 2024-03-21 DIAGNOSIS — Z00.00 MEDICARE ANNUAL WELLNESS VISIT, SUBSEQUENT: Primary | ICD-10-CM

## 2024-03-21 LAB — PSA SERPL-MCNC: 3 NG/ML (ref 0.01–4)

## 2024-03-21 RX ORDER — SILDENAFIL 100 MG/1
100 TABLET, FILM COATED ORAL DAILY PRN
Qty: 30 TABLET | Refills: 3 | Status: SHIPPED | OUTPATIENT
Start: 2024-03-21

## 2024-03-21 ASSESSMENT — ENCOUNTER SYMPTOMS
BLOOD IN STOOL: 0
SHORTNESS OF BREATH: 0

## 2024-03-21 NOTE — PATIENT INSTRUCTIONS

## 2024-03-21 NOTE — PROGRESS NOTES
dentified pt with two pt identifiers(name and ).    Chief Complaint   Patient presents with    Medicare AWV     Patient here for a Medicare Wellness visit.        Health Maintenance Due   Topic    COVID-19 Vaccine (1)    Pneumococcal 65+ years Vaccine (1 of 2 - PCV)    Diabetic retinal exam     DTaP/Tdap/Td vaccine (1 - Tdap)    Colorectal Cancer Screen     Shingles vaccine (1 of 2)    Respiratory Syncytial Virus (RSV) Pregnant or age 60 yrs+ (1 - 1-dose 60+ series)    Flu vaccine (1)    Diabetic foot exam     Annual Wellness Visit (Medicare Advantage)        Wt Readings from Last 3 Encounters:   24 87.9 kg (193 lb 12.8 oz)   23 87.4 kg (192 lb 10.1 oz)   22 88.5 kg (195 lb)     Temp Readings from Last 3 Encounters:   24 97.5 °F (36.4 °C) (Temporal)   23 97.6 °F (36.4 °C) (Temporal)     BP Readings from Last 3 Encounters:   24 134/74   23 127/76   22 (!) 144/83     Pulse Readings from Last 3 Encounters:   24 59   23 66   22 58           Coordination of Care Questionnaire:  :   1. \"Have you been to the ER, urgent care clinic since your last visit?  Hospitalized since your last visit?\" no    2. \"Have you seen or consulted any other health care providers outside of the [facility] since your last visit?\" no     3. For patients aged 45-75: Has the patient had a colonoscopy / FIT/ Cologuard? no      If the patient is female:    4. For patients aged 40-74: Has the patient had a mammogram within the past 2 years? no      5. For patients aged 21-65: Has the patient had a pap smear? no     3) Do you have an Advance Directive on file? no  Are you interested in receiving information about Advance Directives? no    Patient is accompanied by self I have received verbal consent from Ronan Brown to discuss any/all medical information while they are present in the room.

## 2024-03-21 NOTE — PROGRESS NOTES
Ronan Brown  68 y.o. male  1955  MRN:[id number]  [facility]  Progress Note     Encounter Date: 3/21/2024    Assessment and Plan:       ICD-10-CM    1. Medicare annual wellness visit, subsequent  Z00.00       2. Screening for colon cancer  Z12.11 César Vera MD, [facility] ([facility])      3. Encounter for immunization  Z23 pneumococcal 20-valent conjugat (PREVNAR) 0.5 ML JACQUELINE inj     Tetanus-Diphth-Acell Pertussis (BOOSTRIX) 5-2.5-18.5 LF-MCG/0.5 injection     zoster recombinant adjuvanted vaccine (SHINGRIX) 50 MCG/0.5ML SUSR injection      4. Type 1 diabetes mellitus with nephropathy (HCC)  E10.21       5. History of heart attack  I25.2       6. Hypertriglyceridemia  E78.1       7. B12 deficiency  E53.8       8. Erectile dysfunction, unspecified erectile dysfunction type  N52.9 sildenafil (VIAGRA) 100 MG tablet      9. Benign prostatic hyperplasia with nocturia  N40.1 PSA, Diagnostic    R35.1 PSA, Diagnostic      10. Trigger ring finger of left hand  M65.342 MO INJECT TENDON SHEATH/LIGAMENT        1. Medicare annual wellness visit, subsequent  Updated  Vaccines discussed.  PSA Ordered  Agrees to colonoscopy  2. Screening for colon cancer  -     César Vera MD, [facility] (Quan Wharton)  3. Encounter for immunization  -     pneumococcal 20-valent conjugat (PREVNAR) 0.5 ML JACQUELINE inj; Inject 0.5 mLs into the muscle once for 1 dose, Disp-0.5 mL, R-0Print  -     Tetanus-Diphth-Acell Pertussis (BOOSTRIX) 5-2.5-18.5 LF-MCG/0.5 injection; Inject 0.5 mLs into the muscle once for 1 dose, Disp-1 each, R-0Print  -     zoster recombinant adjuvanted vaccine (SHINGRIX) 50 MCG/0.5ML SUSR injection; Inject 0.5 mLs into the muscle once for 1 dose Repeat in 6 months, Disp-1 each, R-1Print  4. Type 1 diabetes mellitus with nephropathy (HCC)  Sees Endocrinology, Dr. Mendez for Type 1 DM.  Has CGM, on insulin.  5. History of heart attack, Still sees

## 2024-04-15 DIAGNOSIS — E53.8 DEFICIENCY OF OTHER SPECIFIED B GROUP VITAMINS: ICD-10-CM

## 2024-04-15 RX ORDER — CYANOCOBALAMIN 1000 UG/ML
INJECTION, SOLUTION INTRAMUSCULAR; SUBCUTANEOUS
Qty: 3 ML | Refills: 1 | Status: SHIPPED | OUTPATIENT
Start: 2024-04-15

## 2024-04-15 NOTE — TELEPHONE ENCOUNTER
PCP: Sanchez Schreiber MD    Last appt: [unfilled]  No future appointments.    Requested Prescriptions     Pending Prescriptions Disp Refills    cyanocobalamin 1000 MCG/ML injection 3 mL 1       Prior labs and Blood pressures:  BP Readings from Last 3 Encounters:   03/21/24 134/74   08/23/23 127/76   12/05/22 (!) 144/83     Lab Results   Component Value Date/Time     08/23/2023 12:01 PM    K 4.4 08/23/2023 12:01 PM     08/23/2023 12:01 PM    CO2 33 08/23/2023 12:01 PM    BUN 19 08/23/2023 12:01 PM    GFRAA >60 02/16/2022 08:45 AM     No results found for: \"HBA1C\", \"[id number]\"  Lab Results   Component Value Date/Time    CHOL 138 08/23/2023 12:01 PM    HDL 60 08/23/2023 12:01 PM     No results found for: \"VITD3\", \"VD3RIA\"    No results found for: \"TSH\", \"TSH2\", \"TSH3\"

## 2024-08-16 LAB
ESTIMATED AVERAGE GLUCOSE: NORMAL
HBA1C MFR BLD: 7.3 %

## 2024-10-16 DIAGNOSIS — E53.8 DEFICIENCY OF OTHER SPECIFIED B GROUP VITAMINS: ICD-10-CM

## 2024-10-16 RX ORDER — CYANOCOBALAMIN 1000 UG/ML
INJECTION, SOLUTION INTRAMUSCULAR; SUBCUTANEOUS
Qty: 3 ML | Refills: 1 | Status: SHIPPED | OUTPATIENT
Start: 2024-10-16

## 2025-04-16 LAB
ESTIMATED AVERAGE GLUCOSE: ABNORMAL
HBA1C MFR BLD: 8.8 %

## 2025-05-12 ENCOUNTER — TELEPHONE (OUTPATIENT)
Facility: CLINIC | Age: 70
End: 2025-05-12

## 2025-05-12 SDOH — HEALTH STABILITY: PHYSICAL HEALTH: ON AVERAGE, HOW MANY MINUTES DO YOU ENGAGE IN EXERCISE AT THIS LEVEL?: 60 MIN

## 2025-05-12 SDOH — HEALTH STABILITY: PHYSICAL HEALTH: ON AVERAGE, HOW MANY DAYS PER WEEK DO YOU ENGAGE IN MODERATE TO STRENUOUS EXERCISE (LIKE A BRISK WALK)?: 3 DAYS

## 2025-05-12 SDOH — ECONOMIC STABILITY: FOOD INSECURITY: WITHIN THE PAST 12 MONTHS, YOU WORRIED THAT YOUR FOOD WOULD RUN OUT BEFORE YOU GOT MONEY TO BUY MORE.: NEVER TRUE

## 2025-05-12 SDOH — ECONOMIC STABILITY: TRANSPORTATION INSECURITY
IN THE PAST 12 MONTHS, HAS LACK OF TRANSPORTATION KEPT YOU FROM MEETINGS, WORK, OR FROM GETTING THINGS NEEDED FOR DAILY LIVING?: NO

## 2025-05-12 SDOH — ECONOMIC STABILITY: FOOD INSECURITY: WITHIN THE PAST 12 MONTHS, THE FOOD YOU BOUGHT JUST DIDN'T LAST AND YOU DIDN'T HAVE MONEY TO GET MORE.: NEVER TRUE

## 2025-05-12 SDOH — ECONOMIC STABILITY: TRANSPORTATION INSECURITY
IN THE PAST 12 MONTHS, HAS THE LACK OF TRANSPORTATION KEPT YOU FROM MEDICAL APPOINTMENTS OR FROM GETTING MEDICATIONS?: NO

## 2025-05-12 SDOH — ECONOMIC STABILITY: INCOME INSECURITY: IN THE LAST 12 MONTHS, WAS THERE A TIME WHEN YOU WERE NOT ABLE TO PAY THE MORTGAGE OR RENT ON TIME?: NO

## 2025-05-12 ASSESSMENT — LIFESTYLE VARIABLES
HOW OFTEN DO YOU HAVE SIX OR MORE DRINKS ON ONE OCCASION: 1
HOW OFTEN DO YOU HAVE A DRINK CONTAINING ALCOHOL: 1
HOW MANY STANDARD DRINKS CONTAINING ALCOHOL DO YOU HAVE ON A TYPICAL DAY: 0
HOW MANY STANDARD DRINKS CONTAINING ALCOHOL DO YOU HAVE ON A TYPICAL DAY: PATIENT DOES NOT DRINK
HOW OFTEN DO YOU HAVE A DRINK CONTAINING ALCOHOL: NEVER

## 2025-05-12 ASSESSMENT — PATIENT HEALTH QUESTIONNAIRE - PHQ9
SUM OF ALL RESPONSES TO PHQ QUESTIONS 1-9: 1
SUM OF ALL RESPONSES TO PHQ QUESTIONS 1-9: 1
1. LITTLE INTEREST OR PLEASURE IN DOING THINGS: SEVERAL DAYS
SUM OF ALL RESPONSES TO PHQ QUESTIONS 1-9: 1
SUM OF ALL RESPONSES TO PHQ QUESTIONS 1-9: 1
2. FEELING DOWN, DEPRESSED OR HOPELESS: NOT AT ALL

## 2025-05-13 ENCOUNTER — OFFICE VISIT (OUTPATIENT)
Facility: CLINIC | Age: 70
End: 2025-05-13
Payer: MEDICARE

## 2025-05-13 VITALS
TEMPERATURE: 98 F | OXYGEN SATURATION: 96 % | HEIGHT: 71 IN | BODY MASS INDEX: 25.48 KG/M2 | SYSTOLIC BLOOD PRESSURE: 121 MMHG | DIASTOLIC BLOOD PRESSURE: 64 MMHG | WEIGHT: 182 LBS | HEART RATE: 62 BPM | RESPIRATION RATE: 16 BRPM

## 2025-05-13 DIAGNOSIS — N40.1 BENIGN PROSTATIC HYPERPLASIA WITH NOCTURIA: ICD-10-CM

## 2025-05-13 DIAGNOSIS — E53.8 B12 DEFICIENCY: ICD-10-CM

## 2025-05-13 DIAGNOSIS — N52.9 ERECTILE DYSFUNCTION, UNSPECIFIED ERECTILE DYSFUNCTION TYPE: ICD-10-CM

## 2025-05-13 DIAGNOSIS — R35.1 BENIGN PROSTATIC HYPERPLASIA WITH NOCTURIA: ICD-10-CM

## 2025-05-13 DIAGNOSIS — I25.2 HISTORY OF HEART ATTACK: ICD-10-CM

## 2025-05-13 DIAGNOSIS — E78.1 HYPERTRIGLYCERIDEMIA: ICD-10-CM

## 2025-05-13 DIAGNOSIS — E10.21 TYPE 1 DIABETES MELLITUS WITH NEPHROPATHY (HCC): ICD-10-CM

## 2025-05-13 DIAGNOSIS — Z00.00 MEDICARE ANNUAL WELLNESS VISIT, SUBSEQUENT: Primary | ICD-10-CM

## 2025-05-13 PROCEDURE — 1123F ACP DISCUSS/DSCN MKR DOCD: CPT | Performed by: FAMILY MEDICINE

## 2025-05-13 PROCEDURE — 99213 OFFICE O/P EST LOW 20 MIN: CPT | Performed by: FAMILY MEDICINE

## 2025-05-13 PROCEDURE — 1159F MED LIST DOCD IN RCRD: CPT | Performed by: FAMILY MEDICINE

## 2025-05-13 PROCEDURE — 1126F AMNT PAIN NOTED NONE PRSNT: CPT | Performed by: FAMILY MEDICINE

## 2025-05-13 PROCEDURE — 1160F RVW MEDS BY RX/DR IN RCRD: CPT | Performed by: FAMILY MEDICINE

## 2025-05-13 PROCEDURE — G0439 PPPS, SUBSEQ VISIT: HCPCS | Performed by: FAMILY MEDICINE

## 2025-05-13 RX ORDER — CYANOCOBALAMIN 1000 UG/ML
INJECTION, SOLUTION INTRAMUSCULAR; SUBCUTANEOUS
Qty: 3 ML | Refills: 3 | Status: SHIPPED | OUTPATIENT
Start: 2025-05-13

## 2025-05-13 RX ORDER — SILDENAFIL 100 MG/1
100 TABLET, FILM COATED ORAL DAILY PRN
Qty: 30 TABLET | Refills: 3 | Status: SHIPPED | OUTPATIENT
Start: 2025-05-13 | End: 2025-05-13 | Stop reason: SDUPTHER

## 2025-05-13 RX ORDER — SILDENAFIL 100 MG/1
100 TABLET, FILM COATED ORAL DAILY PRN
Qty: 30 TABLET | Refills: 3 | Status: SHIPPED | OUTPATIENT
Start: 2025-05-13

## 2025-05-13 RX ORDER — INSULIN LISPRO 100 [IU]/ML
12 INJECTION, SOLUTION INTRAVENOUS; SUBCUTANEOUS
Qty: 15 ADJUSTABLE DOSE PRE-FILLED PEN SYRINGE | Refills: 0
Start: 2025-05-13

## 2025-05-13 ASSESSMENT — ENCOUNTER SYMPTOMS
BLOOD IN STOOL: 0
SHORTNESS OF BREATH: 0

## 2025-05-13 NOTE — PROGRESS NOTES
Chief Complaint   Patient presents with    Medicare AWV     Have you been to the ER, urgent care clinic since your last visit?  Hospitalized since your last visit?   NO    Have you seen or consulted any other health care providers outside our system since your last visit?   NO      “Have you had a colorectal cancer screening such as a colonoscopy/FIT/Cologuard?    NO    No colonoscopy on file  No cologuard on file  No FIT/FOBT on file   No flexible sigmoidoscopy on file     “Have you had a diabetic eye exam?”    YES - March    No diabetic eye exam on file

## 2025-05-13 NOTE — PROGRESS NOTES
Ronan Brown  69 y.o. male  1955  MRN:[id number]  [facility]  Progress Note     Encounter Date: 5/13/2025    Assessment and Plan:       ICD-10-CM    1. Medicare annual wellness visit, subsequent  Z00.00       2. Type 1 diabetes mellitus with nephropathy (HCC)  E10.21 Insulin Degludec 200 UNIT/ML SOPN     HUMALOG KWIKPEN 100 UNIT/ML SOPN      3. B12 deficiency  E53.8 cyanocobalamin 1000 MCG/ML injection      4. Hypertriglyceridemia  E78.1       5. History of heart attack  I25.2       6. Benign prostatic hyperplasia with nocturia  N40.1 PSA, Diagnostic    R35.1       7. Erectile dysfunction, unspecified erectile dysfunction type  N52.9 sildenafil (VIAGRA) 100 MG tablet     DISCONTINUED: sildenafil (VIAGRA) 100 MG tablet        1. Medicare annual wellness visit, subsequent  Updated  Vaccines discussed  Discussed colonoscopy- he will consider and get back to me if he wants to do it.  2. Type 1 diabetes mellitus with nephropathy (HCC)  Sees Dr. Renetta Mendez.  -     Insulin Degludec 200 UNIT/ML SOPN; Inject 40 Units into the skin daily, Disp-15 Adjustable Dose Pre-filled Pen Syringe, R-0NO PRINT  -     HUMALOG KWIKPEN 100 UNIT/ML SOPN; Inject 12 Units into the skin 3 times daily (before meals), Disp-15 Adjustable Dose Pre-filled Pen Syringe, R-0, DAWNO PRINT  3. B12 deficiency  Continue monthly  -     cyanocobalamin 1000 MCG/ML injection; INJECT 1ML INTRAMUSCULARLY EVERY 30 DAYS, Disp-3 mL, R-3Normal  4. Hypertriglyceridemia  On statin  5. History of heart attack Sees Cardiology, Dr. German  6. Benign prostatic hyperplasia with nocturia  -     PSA, Diagnostic; Future  7. Erectile dysfunction, unspecified erectile dysfunction type-refilled.  -     sildenafil (VIAGRA) 100 MG tablet; Take 1 tablet by mouth daily as needed for Erectile Dysfunction (one hour prior to intercourse), Disp-30 tablet, R-3Print       I have discussed the diagnosis with the patient and the intended plan as seen in the

## 2025-05-27 DIAGNOSIS — N40.1 BENIGN PROSTATIC HYPERPLASIA WITH NOCTURIA: ICD-10-CM

## 2025-05-27 DIAGNOSIS — R35.1 BENIGN PROSTATIC HYPERPLASIA WITH NOCTURIA: ICD-10-CM

## 2025-05-28 ENCOUNTER — RESULTS FOLLOW-UP (OUTPATIENT)
Facility: CLINIC | Age: 70
End: 2025-05-28

## 2025-05-28 LAB — PSA SERPL-MCNC: 3.7 NG/ML (ref 0.01–4)

## 2025-07-11 ENCOUNTER — TELEPHONE (OUTPATIENT)
Dept: PHARMACY | Facility: CLINIC | Age: 70
End: 2025-07-11

## 2025-07-11 NOTE — TELEPHONE ENCOUNTER
POPULATION [facility] CLINICAL PHARMACY: ADHERENCE REVIEW  Identified care gap per [employer] fills with [facility]: Statin adherence    Medicare Advantage - MRMA  ACO  Per insurer report, LIS-0 - co-pays are based on tiers and patient is subject to coverage gap.    ASSESSMENT  STATIN ADHERENCE    Insurance Records claims through 25 (Prior Year PDC = 99% - PASSED; YTD PDC = FIRST FILL; Potential Fail Date: 25):   ROSUVASTATIN TAB 20 MG last filled on 25 for 100 day supply. Next refill due: 25    Prescribed si tablet/capsule daily    Per Reconcile Dispense History and Insurer Portal: last filled on 25 for 100 day supply.     Per [facility]: 1.5  refills remaining.    Lab Results   Component Value Date    CHOL 138 2023    TRIG 70 2023    HDL 60 2023     Lab Results   Component Value Date    LDL 64 2023      ALT   Date Value Ref Range Status   2023 40 12 - 78 U/L Final     AST   Date Value Ref Range Status   2023 26 15 - 37 U/L Final     The 10-year ASCVD risk score (Negro DK, et al., 2019) is: 21.3%    Values used to calculate the score:      Age: 69 years      Sex: Male      Is Non- : No      Diabetic: Yes      Tobacco smoker: No      Systolic Blood Pressure: 121 mmHg      Is BP treated: No      HDL Cholesterol: 60 MG/DL      Total Cholesterol: 138 MG/DL     PLAN  The following are interventions that have been identified:   Patient DUE to refill ROSUVASTATIN TAB 20 MG and active on home medication list.     Outreach:  Patient:  [employer] message sent to patient.    Last Visit: 25  Next Visit: none    Lynette Henderson CPhT  Population Health    [facility]   [phone] option 1     For Pharmacy Admin Tracking Only    Program: [employer]  CPA in place:  No  Gap Closed?: No   Time Spent (min): 20

## 2025-08-13 ENCOUNTER — COMMUNITY OUTREACH (OUTPATIENT)
Facility: CLINIC | Age: 70
End: 2025-08-13